# Patient Record
Sex: FEMALE | Race: BLACK OR AFRICAN AMERICAN | Employment: FULL TIME | URBAN - METROPOLITAN AREA
[De-identification: names, ages, dates, MRNs, and addresses within clinical notes are randomized per-mention and may not be internally consistent; named-entity substitution may affect disease eponyms.]

---

## 2019-01-03 PROBLEM — D64.9 ANEMIA: Status: ACTIVE | Noted: 2019-01-03

## 2020-10-15 ENCOUNTER — HOSPITAL ENCOUNTER (EMERGENCY)
Age: 45
Discharge: HOME | End: 2020-10-15
Payer: COMMERCIAL

## 2020-10-15 ENCOUNTER — APPOINTMENT (OUTPATIENT)
Dept: GENERAL RADIOLOGY | Age: 45
End: 2020-10-15
Attending: EMERGENCY MEDICINE
Payer: COMMERCIAL

## 2020-10-15 VITALS
RESPIRATION RATE: 20 BRPM | TEMPERATURE: 98.9 F | OXYGEN SATURATION: 97 % | WEIGHT: 270 LBS | HEART RATE: 92 BPM | DIASTOLIC BLOOD PRESSURE: 80 MMHG | BODY MASS INDEX: 39.99 KG/M2 | SYSTOLIC BLOOD PRESSURE: 147 MMHG | HEIGHT: 69 IN

## 2020-10-15 DIAGNOSIS — J45.901 ASTHMA WITH ACUTE EXACERBATION, UNSPECIFIED ASTHMA SEVERITY, UNSPECIFIED WHETHER PERSISTENT: Primary | ICD-10-CM

## 2020-10-15 LAB
ATRIAL RATE: 108 BPM
CALCULATED P AXIS, ECG09: 60 DEGREES
CALCULATED R AXIS, ECG10: 54 DEGREES
CALCULATED T AXIS, ECG11: 49 DEGREES
DIAGNOSIS, 93000: NORMAL
P-R INTERVAL, ECG05: 126 MS
Q-T INTERVAL, ECG07: 324 MS
QRS DURATION, ECG06: 66 MS
QTC CALCULATION (BEZET), ECG08: 434 MS
VENTRICULAR RATE, ECG03: 108 BPM

## 2020-10-15 PROCEDURE — 99283 EMERGENCY DEPT VISIT LOW MDM: CPT

## 2020-10-15 PROCEDURE — 96366 THER/PROPH/DIAG IV INF ADDON: CPT

## 2020-10-15 PROCEDURE — 94640 AIRWAY INHALATION TREATMENT: CPT

## 2020-10-15 PROCEDURE — 71046 X-RAY EXAM CHEST 2 VIEWS: CPT

## 2020-10-15 PROCEDURE — 74011250636 HC RX REV CODE- 250/636: Performed by: NURSE PRACTITIONER

## 2020-10-15 PROCEDURE — 96375 TX/PRO/DX INJ NEW DRUG ADDON: CPT

## 2020-10-15 PROCEDURE — 74011000250 HC RX REV CODE- 250: Performed by: NURSE PRACTITIONER

## 2020-10-15 PROCEDURE — 93005 ELECTROCARDIOGRAM TRACING: CPT

## 2020-10-15 PROCEDURE — 74011250637 HC RX REV CODE- 250/637: Performed by: NURSE PRACTITIONER

## 2020-10-15 PROCEDURE — 96365 THER/PROPH/DIAG IV INF INIT: CPT

## 2020-10-15 RX ORDER — MAGNESIUM SULFATE 1 G/100ML
1 INJECTION INTRAVENOUS
Status: COMPLETED | OUTPATIENT
Start: 2020-10-15 | End: 2020-10-15

## 2020-10-15 RX ORDER — PREDNISONE 20 MG/1
40 TABLET ORAL DAILY
Qty: 10 TAB | Refills: 0 | Status: SHIPPED | OUTPATIENT
Start: 2020-10-15 | End: 2020-10-20

## 2020-10-15 RX ORDER — IPRATROPIUM BROMIDE AND ALBUTEROL SULFATE 2.5; .5 MG/3ML; MG/3ML
3 SOLUTION RESPIRATORY (INHALATION)
Status: COMPLETED | OUTPATIENT
Start: 2020-10-15 | End: 2020-10-15

## 2020-10-15 RX ORDER — ALBUTEROL SULFATE 90 UG/1
2 AEROSOL, METERED RESPIRATORY (INHALATION)
Qty: 1 INHALER | Refills: 0 | Status: SHIPPED | OUTPATIENT
Start: 2020-10-15

## 2020-10-15 RX ORDER — ALBUTEROL SULFATE 90 UG/1
2 AEROSOL, METERED RESPIRATORY (INHALATION)
Status: COMPLETED | OUTPATIENT
Start: 2020-10-15 | End: 2020-10-15

## 2020-10-15 RX ADMIN — ALBUTEROL SULFATE 2 PUFF: 108 AEROSOL, METERED RESPIRATORY (INHALATION) at 14:26

## 2020-10-15 RX ADMIN — ALBUTEROL SULFATE 2 PUFF: 108 AEROSOL, METERED RESPIRATORY (INHALATION) at 12:46

## 2020-10-15 RX ADMIN — IPRATROPIUM BROMIDE AND ALBUTEROL SULFATE 3 ML: .5; 3 SOLUTION RESPIRATORY (INHALATION) at 15:52

## 2020-10-15 RX ADMIN — MAGNESIUM SULFATE 1 G: 1 INJECTION INTRAVENOUS at 15:55

## 2020-10-15 RX ADMIN — METHYLPREDNISOLONE SODIUM SUCCINATE 125 MG: 125 INJECTION, POWDER, FOR SOLUTION INTRAMUSCULAR; INTRAVENOUS at 12:44

## 2020-10-15 NOTE — ED PROVIDER NOTES
EMERGENCY DEPARTMENT HISTORY AND PHYSICAL EXAM      Date: 10/15/2020  Patient Name: Cici Johnston      History of Presenting Illness     Chief Complaint   Patient presents with    Shortness of Breath       History Provided By: Patient    HPI: Jaime Kahn, 41 y.o. female with a past medical history significant asthma and allergies presents to the ED with cc of shortness of breath and wheezing progressing over the last 3 days. + rhinorrhea. She is in a moderate amount of distress at this time with audible wheezing, accessory muscle use and difficulty talking in complete sentences. She denies any fever/chills, cough or any other URI symptoms. She states she is here visiting her parents and does not have her albuterol inhaler with her. Of note, she states she had a positive COVID exposure approximately 2 weeks ago, however she had a negative COVID test 6 days ago. There are no other complaints, changes, or physical findings at this time. PCP: Marc, MD Glen    Current Outpatient Medications   Medication Sig Dispense Refill    albuterol (Proventil HFA) 90 mcg/actuation inhaler Take 2 Puffs by inhalation every four (4) hours as needed for Wheezing. 1 Inhaler 0    predniSONE (DELTASONE) 20 mg tablet Take 40 mg by mouth daily for 5 days. With Breakfast 10 Tab 0    ibuprofen (MOTRIN) 800 mg tablet Take 1 Tab by mouth every six (6) hours as needed. 30 Tab 1    budesonide-formoterol (SYMBICORT) 160-4.5 mcg/actuation HFAA Take 2 Puffs by inhalation two (2) times a day.  albuterol (PROAIR HFA) 90 mcg/actuation inhaler Take 2 Puffs by inhalation every six (6) hours as needed for Wheezing.          Past History     Past Medical History:  Past Medical History:   Diagnosis Date    H/O seasonal allergies     History of heavy vaginal bleeding     Sinusitis     Vaginal fibroids        Past Surgical History:  Past Surgical History:   Procedure Laterality Date    HX MYOMECTOMY      X2  IR OCCL TXCATH ORGAN W SI         Family History:  Family History   Problem Relation Age of Onset   Yuen Hypertension Mother     Hypertension Father        Social History:  Social History     Tobacco Use    Smoking status: Never Smoker    Smokeless tobacco: Never Used   Substance Use Topics    Alcohol use: Yes    Drug use: No       Allergies:  No Known Allergies      Review of Systems     Review of Systems   Constitutional: Negative. Negative for chills, fatigue and fever. HENT: Positive for rhinorrhea. Negative for congestion, ear pain, sneezing, sore throat and trouble swallowing. Respiratory: Positive for chest tightness, shortness of breath and wheezing. Negative for cough. Cardiovascular: Negative. Negative for chest pain and palpitations. Gastrointestinal: Negative. Genitourinary: Negative. Musculoskeletal: Negative. Skin: Negative. Allergic/Immunologic: Positive for environmental allergies. Neurological: Negative. Negative for dizziness, weakness, light-headedness and headaches. Psychiatric/Behavioral: Negative. All other systems reviewed and are negative. Physical Exam     Physical Exam  Vitals signs and nursing note reviewed. Constitutional:       General: She is in acute distress. Appearance: Normal appearance. HENT:      Head: Normocephalic and atraumatic. Eyes:      Extraocular Movements: Extraocular movements intact. Conjunctiva/sclera: Conjunctivae normal.   Neck:      Musculoskeletal: Normal range of motion and neck supple. Cardiovascular:      Rate and Rhythm: Normal rate and regular rhythm. Heart sounds: Normal heart sounds. No murmur. Pulmonary:      Effort: Tachypnea and accessory muscle usage present. Breath sounds: Decreased air movement present. Examination of the right-upper field reveals wheezing. Examination of the left-upper field reveals wheezing. Examination of the right-lower field reveals wheezing.  Examination of the left-lower field reveals wheezing. Wheezing present. No rhonchi or rales. Abdominal:      General: Bowel sounds are normal.      Palpations: Abdomen is soft. Tenderness: There is no abdominal tenderness. Musculoskeletal: Normal range of motion. Skin:     General: Skin is warm and dry. Findings: No rash. Neurological:      General: No focal deficit present. Mental Status: She is alert. Psychiatric:         Mood and Affect: Mood normal.         Behavior: Behavior normal. Behavior is cooperative. Diagnostic Study Results     Labs -     No results found for this or any previous visit (from the past 12 hour(s)). Radiologic Studies -     CT Results  (Last 48 hours)    None        CXR Results  (Last 48 hours)               10/15/20 1112  XR CHEST PA LAT Final result    Impression:  IMPRESSION:       IMPRESSION: No acute finding. Narrative: Indication: Shortness of breath. PA and lateral radiographs of the chest 15 October 2020. No comparison. Clear lungs. Normal heart size. No pleural effusion or pneumothorax. Normal   bones. Safety pin superficially over the ventral chest.                     Medical Decision Making and ED Course     Vital Signs-Reviewed the patient's vital signs.   Patient Vitals for the past 12 hrs:   Pulse Resp BP SpO2   10/15/20 1740 92 20 (!) 147/80 97 %   10/15/20 1553    96 %   10/15/20 1426    97 %   10/15/20 1254    97 %         Records Reviewed: Nursing Notes    The patient presents with SOB with a differential diagnosis of asthma exacerbation, URI, respiratory failure, PNA, allergies, COVID-19      Provider Notes (Medical Decision Making):   Findings discussed with Dr. Clarisa Upton; pt is feeling much better with good air movement post neb tx and IV magnesium, she denies any further chest tightness, she has not had any hypoxia throughout visit; pt feels stable to go home, reinforced importance of returning promptly without delay should symptoms return, pt verbalizes understanding, will d/c with 5 days prednisone and albuterol HFA  [facility]       ED Course:     - I am the first and primary provider for this patient. - I reviewed the vital signs, available nursing notes, past medical history, past surgical history, family history and social history. Initial assessment performed. The patients presenting problems have been discussed, and they are in agreement with the care plan formulated and outlined with them. I have encouraged them to ask questions as they arise throughout their visit. ED Course as of Oct 15 2357   Thu Oct 15, 2020   1329 Pt feeling better, she denies dyspnea at this time; lungs diminished with scattered exp wheezing throughout     [LP]   1530 Pt reports feeling better but continues with few scattered wheezes and chest tightness, no hypoxia    [LP]   1721 Pt feeling much better post neb tx and magnesium, good air movement throughout    [LP]      ED Course User Index  [LP] Claudette Graham NP       CONSULTATIONS:    Attending, Dr Bulmaro Moncada    Disposition   discharge      DISCHARGE PLAN:  1. Current Discharge Medication List      CONTINUE these medications which have NOT CHANGED    Details   ibuprofen (MOTRIN) 800 mg tablet Take 1 Tab by mouth every six (6) hours as needed. Qty: 30 Tab, Refills: 1      budesonide-formoterol (SYMBICORT) 160-4.5 mcg/actuation HFAA Take 2 Puffs by inhalation two (2) times a day. albuterol (PROAIR HFA) 90 mcg/actuation inhaler Take 2 Puffs by inhalation every six (6) hours as needed for Wheezing.            2.   Follow-up Information     Follow up With Specialties Details Why [address] EMERGENCY DEPT Emergency Medicine  If symptoms worsen [address] [facility] [address]  [address]  Schedule an appointment as soon as possible for a visit  or your primary MD [address] [place] [address]  [phone] 3.  Return to ED if worse   4. Discharge Medication List as of 10/15/2020  5:34 PM      START taking these medications    Details   !! albuterol (Proventil HFA) 90 mcg/actuation inhaler Take 2 Puffs by inhalation every four (4) hours as needed for Wheezing., Normal, Disp-1 Inhaler,R-0      predniSONE (DELTASONE) 20 mg tablet Take 40 mg by mouth daily for 5 days. With Breakfast, Normal, Disp-10 Tab,R-0       !! - Potential duplicate medications found. Please discuss with provider. CONTINUE these medications which have NOT CHANGED    Details   ibuprofen (MOTRIN) 800 mg tablet Take 1 Tab by mouth every six (6) hours as needed. , Print, Disp-30 Tab, R-1      budesonide-formoterol (SYMBICORT) 160-4.5 mcg/actuation HFAA Take 2 Puffs by inhalation two (2) times a day., Historical Med      !! albuterol (PROAIR HFA) 90 mcg/actuation inhaler Take 2 Puffs by inhalation every six (6) hours as needed for Wheezing., Historical Med       !! - Potential duplicate medications found. Please discuss with provider. Diagnosis     Clinical Impression:   1. Asthma with acute exacerbation, unspecified asthma severity, unspecified whether persistent        Attestations:    Ahmet De La Cruz NP    Please note that this dictation was completed with [employer], the [employer] voice recognition software. Quite often unanticipated grammatical, syntax, homophones, and other interpretive errors are inadvertently transcribed by the computer software. Please disregard these errors. Please excuse any errors that have escaped final proofreading. Thank you.

## 2020-10-15 NOTE — ED TRIAGE NOTES
Pt states she has a \"pressure to breathe\" that started 2 days pta, denies cough, states the pressure comes and goes, describes it as a 'compression'

## 2022-03-20 PROBLEM — D64.9 ANEMIA: Status: ACTIVE | Noted: 2019-01-03

## 2023-05-11 RX ORDER — IBUPROFEN 800 MG/1
TABLET ORAL EVERY 6 HOURS PRN
COMMUNITY
Start: 2019-01-05

## 2023-05-11 RX ORDER — ALBUTEROL SULFATE 90 UG/1
2 AEROSOL, METERED RESPIRATORY (INHALATION) EVERY 4 HOURS PRN
COMMUNITY
Start: 2020-10-15

## 2023-05-11 RX ORDER — BUDESONIDE AND FORMOTEROL FUMARATE DIHYDRATE 160; 4.5 UG/1; UG/1
2 AEROSOL RESPIRATORY (INHALATION) 2 TIMES DAILY
COMMUNITY

## 2025-07-17 ENCOUNTER — HOSPITAL ENCOUNTER (INPATIENT)
Facility: HOSPITAL | Age: 50
LOS: 8 days | Discharge: HOME | DRG: 336 | End: 2025-07-25
Attending: INTERNAL MEDICINE | Admitting: INTERNAL MEDICINE
Payer: COMMERCIAL

## 2025-07-17 ENCOUNTER — APPOINTMENT (OUTPATIENT)
Facility: HOSPITAL | Age: 50
DRG: 336 | End: 2025-07-17
Payer: COMMERCIAL

## 2025-07-17 DIAGNOSIS — R10.31 RIGHT LOWER QUADRANT ABDOMINAL PAIN: Primary | ICD-10-CM

## 2025-07-17 DIAGNOSIS — K56.609 SBO (SMALL BOWEL OBSTRUCTION) (HCC): ICD-10-CM

## 2025-07-17 DIAGNOSIS — K66.0 ABDOMINAL ADHESIONS: ICD-10-CM

## 2025-07-17 PROBLEM — K56.50 SMALL BOWEL OBSTRUCTION DUE TO ADHESIONS (HCC): Status: ACTIVE | Noted: 2025-07-17

## 2025-07-17 PROBLEM — R10.9 ABDOMINAL PAIN: Status: ACTIVE | Noted: 2025-07-17

## 2025-07-17 LAB
ALBUMIN SERPL-MCNC: 4.1 G/DL (ref 3.5–5)
ALBUMIN/GLOB SERPL: 1 (ref 1.1–2.2)
ALP SERPL-CCNC: 93 U/L (ref 45–117)
ALT SERPL-CCNC: 44 U/L (ref 12–78)
ANION GAP SERPL CALC-SCNC: 4 MMOL/L (ref 2–12)
ANION GAP SERPL CALC-SCNC: 7 MMOL/L (ref 2–12)
APPEARANCE UR: ABNORMAL
AST SERPL W P-5'-P-CCNC: ABNORMAL U/L (ref 15–37)
BACTERIA URNS QL MICRO: ABNORMAL /HPF
BASOPHILS # BLD: 0.02 K/UL (ref 0–0.1)
BASOPHILS NFR BLD: 0.3 % (ref 0–1)
BILIRUB SERPL-MCNC: 0.5 MG/DL (ref 0.2–1)
BILIRUB UR QL: NEGATIVE
BUN SERPL-MCNC: 9 MG/DL (ref 6–20)
BUN SERPL-MCNC: 9 MG/DL (ref 6–20)
BUN/CREAT SERPL: 10 (ref 12–20)
BUN/CREAT SERPL: 11 (ref 12–20)
CA-I BLD-MCNC: 10.3 MG/DL (ref 8.5–10.1)
CA-I BLD-MCNC: 9.8 MG/DL (ref 8.5–10.1)
CHLORIDE SERPL-SCNC: 107 MMOL/L (ref 97–108)
CHLORIDE SERPL-SCNC: 109 MMOL/L (ref 97–108)
CO2 SERPL-SCNC: 25 MMOL/L (ref 21–32)
CO2 SERPL-SCNC: 26 MMOL/L (ref 21–32)
COLOR UR: ABNORMAL
CREAT SERPL-MCNC: 0.8 MG/DL (ref 0.55–1.02)
CREAT SERPL-MCNC: 0.86 MG/DL (ref 0.55–1.02)
DIFFERENTIAL METHOD BLD: ABNORMAL
EOSINOPHIL # BLD: 0 K/UL (ref 0–0.4)
EOSINOPHIL NFR BLD: 0 % (ref 0–7)
EPITH CASTS URNS QL MICRO: ABNORMAL /LPF
ERYTHROCYTE [DISTWIDTH] IN BLOOD BY AUTOMATED COUNT: 12.9 % (ref 11.5–14.5)
GLOBULIN SER CALC-MCNC: 4.2 G/DL (ref 2–4)
GLUCOSE SERPL-MCNC: 122 MG/DL (ref 65–100)
GLUCOSE SERPL-MCNC: 124 MG/DL (ref 65–100)
GLUCOSE UR STRIP.AUTO-MCNC: NEGATIVE MG/DL
HCG, URINE, POC: NEGATIVE
HCT VFR BLD AUTO: 44.4 % (ref 35–47)
HGB BLD-MCNC: 14.6 G/DL (ref 11.5–16)
HGB UR QL STRIP: NEGATIVE
IMM GRANULOCYTES # BLD AUTO: 0.03 K/UL (ref 0–0.04)
IMM GRANULOCYTES NFR BLD AUTO: 0.4 % (ref 0–0.5)
KETONES UR QL STRIP.AUTO: NEGATIVE MG/DL
LEUKOCYTE ESTERASE UR QL STRIP.AUTO: NEGATIVE
LIPASE SERPL-CCNC: 20 U/L (ref 13–75)
LYMPHOCYTES # BLD: 0.88 K/UL (ref 0.8–3.5)
LYMPHOCYTES NFR BLD: 12.2 % (ref 12–49)
Lab: NORMAL
MCH RBC QN AUTO: 28.1 PG (ref 26–34)
MCHC RBC AUTO-ENTMCNC: 32.9 G/DL (ref 30–36.5)
MCV RBC AUTO: 85.4 FL (ref 80–99)
MONOCYTES # BLD: 0.43 K/UL (ref 0–1)
MONOCYTES NFR BLD: 5.9 % (ref 5–13)
MUCOUS THREADS URNS QL MICRO: ABNORMAL /LPF
NEGATIVE QC PASS/FAIL: NORMAL
NEUTS SEG # BLD: 5.87 K/UL (ref 1.8–8)
NEUTS SEG NFR BLD: 81.2 % (ref 32–75)
NITRITE UR QL STRIP.AUTO: NEGATIVE
NRBC # BLD: 0 K/UL (ref 0–0.01)
NRBC BLD-RTO: 0 PER 100 WBC
PH UR STRIP: 5 (ref 5–8)
PLATELET # BLD AUTO: 337 K/UL (ref 150–400)
PMV BLD AUTO: 10.7 FL (ref 8.9–12.9)
POSITIVE QC PASS/FAIL: NORMAL
POTASSIUM SERPL-SCNC: 3.5 MMOL/L (ref 3.5–5.1)
POTASSIUM SERPL-SCNC: ABNORMAL MMOL/L (ref 3.5–5.1)
PROT SERPL-MCNC: 8.3 G/DL (ref 6.4–8.2)
PROT UR STRIP-MCNC: 30 MG/DL
RBC # BLD AUTO: 5.2 M/UL (ref 3.8–5.2)
RBC #/AREA URNS HPF: ABNORMAL /HPF (ref 0–5)
SODIUM SERPL-SCNC: 136 MMOL/L (ref 136–145)
SODIUM SERPL-SCNC: 142 MMOL/L (ref 136–145)
SP GR UR REFRACTOMETRY: 1.03 (ref 1–1.03)
URINE CULTURE IF INDICATED: ABNORMAL
UROBILINOGEN UR QL STRIP.AUTO: 0.1 EU/DL (ref 0.1–1)
WBC # BLD AUTO: 7.2 K/UL (ref 3.6–11)
WBC URNS QL MICRO: ABNORMAL /HPF (ref 0–4)

## 2025-07-17 PROCEDURE — 71045 X-RAY EXAM CHEST 1 VIEW: CPT

## 2025-07-17 PROCEDURE — 6360000002 HC RX W HCPCS

## 2025-07-17 PROCEDURE — 2580000003 HC RX 258: Performed by: SURGERY

## 2025-07-17 PROCEDURE — 2580000003 HC RX 258

## 2025-07-17 PROCEDURE — 1100000000 HC RM PRIVATE

## 2025-07-17 PROCEDURE — 81001 URINALYSIS AUTO W/SCOPE: CPT

## 2025-07-17 PROCEDURE — 99222 1ST HOSP IP/OBS MODERATE 55: CPT | Performed by: SURGERY

## 2025-07-17 PROCEDURE — 6360000002 HC RX W HCPCS: Performed by: INTERNAL MEDICINE

## 2025-07-17 PROCEDURE — 2500000003 HC RX 250 WO HCPCS: Performed by: INTERNAL MEDICINE

## 2025-07-17 PROCEDURE — 36415 COLL VENOUS BLD VENIPUNCTURE: CPT

## 2025-07-17 PROCEDURE — 6360000004 HC RX CONTRAST MEDICATION

## 2025-07-17 PROCEDURE — 6370000000 HC RX 637 (ALT 250 FOR IP): Performed by: SURGERY

## 2025-07-17 PROCEDURE — 85025 COMPLETE CBC W/AUTO DIFF WBC: CPT

## 2025-07-17 PROCEDURE — 6370000000 HC RX 637 (ALT 250 FOR IP)

## 2025-07-17 PROCEDURE — 83690 ASSAY OF LIPASE: CPT

## 2025-07-17 PROCEDURE — 99285 EMERGENCY DEPT VISIT HI MDM: CPT

## 2025-07-17 PROCEDURE — 74177 CT ABD & PELVIS W/CONTRAST: CPT

## 2025-07-17 PROCEDURE — 80048 BASIC METABOLIC PNL TOTAL CA: CPT

## 2025-07-17 PROCEDURE — 96374 THER/PROPH/DIAG INJ IV PUSH: CPT

## 2025-07-17 PROCEDURE — 96375 TX/PRO/DX INJ NEW DRUG ADDON: CPT

## 2025-07-17 PROCEDURE — 80053 COMPREHEN METABOLIC PANEL: CPT

## 2025-07-17 RX ORDER — ONDANSETRON 2 MG/ML
4 INJECTION INTRAMUSCULAR; INTRAVENOUS ONCE
Status: COMPLETED | OUTPATIENT
Start: 2025-07-17 | End: 2025-07-17

## 2025-07-17 RX ORDER — SODIUM CHLORIDE 0.9 % (FLUSH) 0.9 %
5-40 SYRINGE (ML) INJECTION PRN
Status: DISCONTINUED | OUTPATIENT
Start: 2025-07-17 | End: 2025-07-25 | Stop reason: HOSPADM

## 2025-07-17 RX ORDER — POTASSIUM CHLORIDE 1500 MG/1
40 TABLET, EXTENDED RELEASE ORAL PRN
Status: DISCONTINUED | OUTPATIENT
Start: 2025-07-17 | End: 2025-07-25 | Stop reason: HOSPADM

## 2025-07-17 RX ORDER — MIDAZOLAM HYDROCHLORIDE 2 MG/2ML
1 INJECTION, SOLUTION INTRAMUSCULAR; INTRAVENOUS ONCE
Status: DISCONTINUED | OUTPATIENT
Start: 2025-07-17 | End: 2025-07-25 | Stop reason: HOSPADM

## 2025-07-17 RX ORDER — SODIUM CHLORIDE, SODIUM LACTATE, POTASSIUM CHLORIDE, CALCIUM CHLORIDE 600; 310; 30; 20 MG/100ML; MG/100ML; MG/100ML; MG/100ML
INJECTION, SOLUTION INTRAVENOUS CONTINUOUS
Status: DISCONTINUED | OUTPATIENT
Start: 2025-07-17 | End: 2025-07-21

## 2025-07-17 RX ORDER — POTASSIUM CHLORIDE 7.45 MG/ML
10 INJECTION INTRAVENOUS PRN
Status: DISCONTINUED | OUTPATIENT
Start: 2025-07-17 | End: 2025-07-25 | Stop reason: HOSPADM

## 2025-07-17 RX ORDER — PROCHLORPERAZINE EDISYLATE 5 MG/ML
10 INJECTION INTRAMUSCULAR; INTRAVENOUS EVERY 6 HOURS PRN
Status: DISCONTINUED | OUTPATIENT
Start: 2025-07-17 | End: 2025-07-25 | Stop reason: HOSPADM

## 2025-07-17 RX ORDER — POLYETHYLENE GLYCOL 3350 17 G/17G
17 POWDER, FOR SOLUTION ORAL DAILY PRN
Status: DISCONTINUED | OUTPATIENT
Start: 2025-07-17 | End: 2025-07-25 | Stop reason: HOSPADM

## 2025-07-17 RX ORDER — SEMAGLUTIDE 1 MG/.5ML
1 INJECTION, SOLUTION SUBCUTANEOUS
COMMUNITY

## 2025-07-17 RX ORDER — HYDROXYZINE HYDROCHLORIDE 25 MG/ML
25 INJECTION, SOLUTION INTRAMUSCULAR ONCE
Status: DISCONTINUED | OUTPATIENT
Start: 2025-07-17 | End: 2025-07-17

## 2025-07-17 RX ORDER — ACETAMINOPHEN 325 MG/1
650 TABLET ORAL EVERY 6 HOURS PRN
Status: DISCONTINUED | OUTPATIENT
Start: 2025-07-17 | End: 2025-07-25 | Stop reason: HOSPADM

## 2025-07-17 RX ORDER — IOPAMIDOL 755 MG/ML
100 INJECTION, SOLUTION INTRAVASCULAR
Status: COMPLETED | OUTPATIENT
Start: 2025-07-17 | End: 2025-07-17

## 2025-07-17 RX ORDER — LIDOCAINE HYDROCHLORIDE 20 MG/ML
JELLY TOPICAL ONCE
Status: COMPLETED | OUTPATIENT
Start: 2025-07-17 | End: 2025-07-17

## 2025-07-17 RX ORDER — ONDANSETRON 2 MG/ML
4 INJECTION INTRAMUSCULAR; INTRAVENOUS EVERY 6 HOURS PRN
Status: DISCONTINUED | OUTPATIENT
Start: 2025-07-17 | End: 2025-07-25 | Stop reason: HOSPADM

## 2025-07-17 RX ORDER — ENOXAPARIN SODIUM 100 MG/ML
30 INJECTION SUBCUTANEOUS 2 TIMES DAILY
Status: DISCONTINUED | OUTPATIENT
Start: 2025-07-17 | End: 2025-07-25 | Stop reason: HOSPADM

## 2025-07-17 RX ORDER — SODIUM CHLORIDE 9 MG/ML
INJECTION, SOLUTION INTRAVENOUS PRN
Status: DISCONTINUED | OUTPATIENT
Start: 2025-07-17 | End: 2025-07-25 | Stop reason: HOSPADM

## 2025-07-17 RX ORDER — ACETAMINOPHEN 500 MG
1000 TABLET ORAL
Status: COMPLETED | OUTPATIENT
Start: 2025-07-17 | End: 2025-07-17

## 2025-07-17 RX ORDER — 0.9 % SODIUM CHLORIDE 0.9 %
1000 INTRAVENOUS SOLUTION INTRAVENOUS ONCE
Status: COMPLETED | OUTPATIENT
Start: 2025-07-17 | End: 2025-07-17

## 2025-07-17 RX ORDER — KETOROLAC TROMETHAMINE 15 MG/ML
15 INJECTION, SOLUTION INTRAMUSCULAR; INTRAVENOUS ONCE
Status: COMPLETED | OUTPATIENT
Start: 2025-07-17 | End: 2025-07-17

## 2025-07-17 RX ORDER — MORPHINE SULFATE 2 MG/ML
2 INJECTION, SOLUTION INTRAMUSCULAR; INTRAVENOUS EVERY 4 HOURS PRN
Status: DISPENSED | OUTPATIENT
Start: 2025-07-17 | End: 2025-07-19

## 2025-07-17 RX ORDER — SODIUM CHLORIDE 0.9 % (FLUSH) 0.9 %
5-40 SYRINGE (ML) INJECTION EVERY 12 HOURS SCHEDULED
Status: DISCONTINUED | OUTPATIENT
Start: 2025-07-17 | End: 2025-07-25 | Stop reason: HOSPADM

## 2025-07-17 RX ORDER — OXYMETAZOLINE HYDROCHLORIDE 0.05 G/100ML
2 SPRAY NASAL ONCE
Status: COMPLETED | OUTPATIENT
Start: 2025-07-17 | End: 2025-07-17

## 2025-07-17 RX ORDER — MIDAZOLAM HYDROCHLORIDE 2 MG/2ML
0.5 INJECTION, SOLUTION INTRAMUSCULAR; INTRAVENOUS ONCE
Status: DISCONTINUED | OUTPATIENT
Start: 2025-07-17 | End: 2025-07-17

## 2025-07-17 RX ORDER — ACETAMINOPHEN 650 MG/1
650 SUPPOSITORY RECTAL EVERY 6 HOURS PRN
Status: DISCONTINUED | OUTPATIENT
Start: 2025-07-17 | End: 2025-07-25 | Stop reason: HOSPADM

## 2025-07-17 RX ORDER — MAGNESIUM SULFATE IN WATER 40 MG/ML
2000 INJECTION, SOLUTION INTRAVENOUS PRN
Status: DISCONTINUED | OUTPATIENT
Start: 2025-07-17 | End: 2025-07-25 | Stop reason: HOSPADM

## 2025-07-17 RX ORDER — ONDANSETRON 4 MG/1
4 TABLET, ORALLY DISINTEGRATING ORAL EVERY 8 HOURS PRN
Status: DISCONTINUED | OUTPATIENT
Start: 2025-07-17 | End: 2025-07-25 | Stop reason: HOSPADM

## 2025-07-17 RX ADMIN — KETOROLAC TROMETHAMINE 15 MG: 15 INJECTION, SOLUTION INTRAMUSCULAR; INTRAVENOUS at 10:05

## 2025-07-17 RX ADMIN — ONDANSETRON 4 MG: 2 INJECTION INTRAMUSCULAR; INTRAVENOUS at 16:30

## 2025-07-17 RX ADMIN — MORPHINE SULFATE 2 MG: 2 INJECTION, SOLUTION INTRAMUSCULAR; INTRAVENOUS at 21:02

## 2025-07-17 RX ADMIN — SODIUM CHLORIDE, PRESERVATIVE FREE 10 ML: 5 INJECTION INTRAVENOUS at 20:34

## 2025-07-17 RX ADMIN — SODIUM CHLORIDE 1000 ML: 0.9 INJECTION, SOLUTION INTRAVENOUS at 10:08

## 2025-07-17 RX ADMIN — MORPHINE SULFATE 2 MG: 2 INJECTION, SOLUTION INTRAMUSCULAR; INTRAVENOUS at 16:29

## 2025-07-17 RX ADMIN — ONDANSETRON 4 MG: 2 INJECTION, SOLUTION INTRAMUSCULAR; INTRAVENOUS at 10:05

## 2025-07-17 RX ADMIN — ACETAMINOPHEN 1000 MG: 500 TABLET ORAL at 10:03

## 2025-07-17 RX ADMIN — SODIUM CHLORIDE 1000 ML: 9 INJECTION, SOLUTION INTRAVENOUS at 13:42

## 2025-07-17 RX ADMIN — LIDOCAINE HYDROCHLORIDE: 20 JELLY TOPICAL at 13:37

## 2025-07-17 RX ADMIN — SODIUM CHLORIDE, PRESERVATIVE FREE 10 ML: 5 INJECTION INTRAVENOUS at 16:31

## 2025-07-17 RX ADMIN — ONDANSETRON 4 MG: 2 INJECTION, SOLUTION INTRAMUSCULAR; INTRAVENOUS at 15:12

## 2025-07-17 RX ADMIN — IOPAMIDOL 100 ML: 755 INJECTION, SOLUTION INTRAVENOUS at 12:00

## 2025-07-17 RX ADMIN — PROCHLORPERAZINE EDISYLATE 10 MG: 5 INJECTION INTRAMUSCULAR; INTRAVENOUS at 21:18

## 2025-07-17 RX ADMIN — OXYMETAZOLINE HYDROCHLORIDE 2 SPRAY: 0.5 SPRAY NASAL at 13:37

## 2025-07-17 RX ADMIN — SODIUM CHLORIDE, SODIUM LACTATE, POTASSIUM CHLORIDE, AND CALCIUM CHLORIDE: .6; .31; .03; .02 INJECTION, SOLUTION INTRAVENOUS at 18:21

## 2025-07-17 ASSESSMENT — PAIN DESCRIPTION - LOCATION
LOCATION: ABDOMEN
LOCATION: ABDOMEN

## 2025-07-17 ASSESSMENT — LIFESTYLE VARIABLES
HOW MANY STANDARD DRINKS CONTAINING ALCOHOL DO YOU HAVE ON A TYPICAL DAY: PATIENT DOES NOT DRINK
HOW OFTEN DO YOU HAVE A DRINK CONTAINING ALCOHOL: MONTHLY OR LESS
HOW OFTEN DO YOU HAVE A DRINK CONTAINING ALCOHOL: NEVER
HOW MANY STANDARD DRINKS CONTAINING ALCOHOL DO YOU HAVE ON A TYPICAL DAY: 1 OR 2

## 2025-07-17 ASSESSMENT — PAIN SCALES - GENERAL
PAINLEVEL_OUTOF10: 10
PAINLEVEL_OUTOF10: 4
PAINLEVEL_OUTOF10: 3
PAINLEVEL_OUTOF10: 4
PAINLEVEL_OUTOF10: 7
PAINLEVEL_OUTOF10: 4
PAINLEVEL_OUTOF10: 6
PAINLEVEL_OUTOF10: 7
PAINLEVEL_OUTOF10: 3
PAINLEVEL_OUTOF10: 3
PAINLEVEL_OUTOF10: 7
PAINLEVEL_OUTOF10: 6

## 2025-07-17 ASSESSMENT — PAIN - FUNCTIONAL ASSESSMENT
PAIN_FUNCTIONAL_ASSESSMENT: 0-10

## 2025-07-17 NOTE — ED PROVIDER NOTES
[facility] EMERGENCY DEPT  EMERGENCY DEPARTMENT HISTORY AND PHYSICAL EXAM      Date of evaluation: 7/17/2025  Patient Name: Liz Simms  Birthdate 1975  MRN: [id number]  ED Provider: Brayan Alarcon PA-C   Note Started: 9:27 AM EDT 7/17/25    HISTORY OF PRESENT ILLNESS     Chief Complaint   Patient presents with    Vomiting       History Provided By: Patient, only     HPI: Liz Smims is a 49 y.o. female with past medical history as listed below presents emergency department for evaluation of sudden onset abdominal pain nausea vomiting that began 1 day prior to arrival.  Patient reports that she recently upped her dose of Wegovy and thinks this is contributing to her symptoms states that she referenced a friend of hers who is a physician and states that her symptoms are likely due to increase dose of her medication but she should be evaluated in the ER due to sudden nature of her symptoms.  States that she last had something to eat the night before, has not had anything to eat or drink today reporting difficulty keeping liquids and solids down.    PAST MEDICAL HISTORY   Past Medical History:  Past Medical History:   Diagnosis Date    H/O seasonal allergies     History of heavy vaginal bleeding     Sinusitis     Vaginal fibroids        Past Surgical History:  Past Surgical History:   Procedure Laterality Date    IR EMBOLIZATION TUMOR/ORGAN ISCH/INFARC      MYOMECTOMY      X2        Family History:  Family History   Problem Relation Age of Onset    Hypertension Father     Hypertension Mother        Social History:  Social History     Tobacco Use    Smoking status: Never    Smokeless tobacco: Never   Substance Use Topics    Alcohol use: Yes    Drug use: No       Allergies:  No Known Allergies    PCP: No, Pcp    Current Meds:   Current Facility-Administered Medications   Medication Dose Route Frequency Provider Last Rate Last Admin    morphine (PF) injection 2 mg  2 mg IntraVENous Q4H PRN

## 2025-07-17 NOTE — ED TRIAGE NOTES
Patient came in complaining of vomiting that started around 5pm yesterday evening. Patient takes wegovy and went up to 1mg on last Friday.

## 2025-07-17 NOTE — CONSULTS
General Surgery Consultation      History of Presentl Illness      Liz Simms is an 49 y.o. who has had a surgical history of myomectomies and eventually a hysterectomy in 2019, never had an episode of bowel obstruction, presents today with a one day history of acute onset of abdominal pain associated with nausea and vomiting along with obstipation, with no flatus or stool since the pain started. Now reports pain some what improved but not fully resolved.     Past Medical History:   Diagnosis Date    H/O seasonal allergies     History of heavy vaginal bleeding     Sinusitis     Vaginal fibroids      Family History   Problem Relation Age of Onset    Hypertension Father     Hypertension Mother      Prior to Admission Medications   Prescriptions Last Dose Informant Patient Reported? Taking?   Semaglutide-Weight Management (WEGOVY) 1 MG/0.5ML SOAJ SC injection Past Week  Yes Yes   Sig: Inject 1 mg into the skin every 7 days   albuterol sulfate HFA (PROVENTIL;VENTOLIN;PROAIR) 108 (90 Base) MCG/ACT inhaler Past Week  Yes Yes   Sig: Inhale 2 puffs into the lungs every 4 hours as needed   budesonide-formoterol (SYMBICORT) 160-4.5 MCG/ACT AERO Past Week  Yes Yes   Sig: Inhale 2 puffs into the lungs 2 times daily   ibuprofen (ADVIL;MOTRIN) 800 MG tablet Past Week  Yes Yes   Sig: Take by mouth every 6 hours as needed      Facility-Administered Medications: None     No Known Allergies  Social History     Socioeconomic History    Marital status:      Spouse name: Not on file    Number of children: Not on file    Years of education: Not on file    Highest education level: Not on file   Occupational History    Not on file   Tobacco Use    Smoking status: Never    Smokeless tobacco: Never   Substance and Sexual Activity    Alcohol use: Yes    Drug use: No    Sexual activity: Not on file   Other Topics Concern    Not on file   Social History Narrative    Not on file     Social Drivers of Health     Financial

## 2025-07-17 NOTE — H&P
V2.0  History and Physical      Name:  Liz Simms /Age/Sex: 1975  (49 y.o. female)   MRN & CSN:  [id number] & [id number] Encounter Date/Time: 2025 3:35 PM EDT   Location:  ER PCP: Roxy, Pcp       Hospital Day: 1    Assessment and Plan:   Liz Simms is a 49 y.o. female with a benign pmh who presents with Abdominal pain    Hospital Problems           Last Modified POA    * (Principal) Abdominal pain 2025 Yes         Principal Problem:    Abdominal pain  Secondary to small bowel obstruction  Consider cause possibly due to internal hernia but also need to consider that Wegovy can slow GI transit and perhaps may be contributing  Plan:   .  Continue NG suction  IV fluids  Conservative use of pain medication  Antiemetic as needed  Discussed with the patient that she should have a conversation with the prescriber of her Wegovy to see if he can still be given or if any adjustment need to be made       Admit to In-Patient    Disposition:   Current Living situation: Home  Expected Disposition: Home  Estimated D/C: 2-3 days    Diet N.p.o.   DVT Prophylaxis [x] Lovenox, []  Heparin, [] SCDs, [] Ambulation,  [] Eliquis, [] Xarelto, [] Coumadin   Code Status Full   Surrogate Decision Maker/ POA Unknown     Personally reviewed Lab Studies and Imaging     Discussed management with the ED provider and agree with the plan for hospitalization      History from:     patient, Quality of history:  good historian    Prior records reviewed     History of Present Illness:     Chief Complaint: Abdominal pain, nausea, vomiting    Liz Simms is a 49 y.o. female with a benign pmh who presents with Abdominal pain  Symptoms of sudden and acute onset beginning today.  Patient has had prior emergency hysterectomy and says that some of her bowels had to be \"reconstructed\".  ER evaluation shows a CT scan with a small bowel obstruction with transition point possibly secondary to internal

## 2025-07-17 NOTE — ED NOTES
Resting in bed. Pain at a 4 on pain scale. Vss. Family at bedside. To CT at this time. Urine collected and sent to lab as ordered. Will continue to closely monitor.

## 2025-07-17 NOTE — ED NOTES
Notes some nausea but other wise comfortable. No s/s of distress. Tolerating NGT currently. A/o X4. Family at [facility] will continue to closely monitor.

## 2025-07-17 NOTE — ED NOTES
Resting comfortably in bed. Denies needs. No s/s of distress. Tolerating NGT well. Will continue to closely monitor.

## 2025-07-17 NOTE — ED NOTES
ED TO INPATIENT SBAR HANDOFF    Patient Name: Liz Simms   Preferred Name: Liz Sánchez  : 1975  49 y.o.   Family/Caregiver Present: no   Code Status Order: No Order  PO Status: NPO:No  Telemetry Order: No  C-SSRS: Risk of Suicide: No Risk  Sitter no   Restraints:     Sepsis Risk Score Sepsis V2 Risk Score: 5.4    Situation  Chief Complaint   Patient presents with    Vomiting     Brief Description of Patient's Condition: abd pain with SBO. NGT placed.  Mental Status: oriented, alert, coherent, logical, thought processes intact, and able to concentrate and follow conversation  Arrived from:Home  Imaging:   XR CHEST PORTABLE   Final Result      No acute process on portable chest. Unchanged elevation of the left   hemidiaphragm. Nasogastric tube appears to be in satisfactory position.         Electronically signed by Martin Salinas      CT ABDOMEN PELVIS W IV CONTRAST Additional Contrast? None   Final Result   Distal small bowel obstruction, likely secondary to internal hernia.      Electronically signed by Martin Salinas        Abnormal labs:   Abnormal Labs Reviewed   CBC WITH AUTO DIFFERENTIAL - Abnormal; Notable for the following components:       Result Value    Neutrophils % 81.2 (*)     All other components within normal limits   COMPREHENSIVE METABOLIC PANEL - Abnormal; Notable for the following components:    Glucose 122 (*)     BUN/Creatinine Ratio 10 (*)     Calcium 10.3 (*)     Total Protein 8.3 (*)     Globulin 4.2 (*)     Albumin/Globulin Ratio 1.0 (*)     All other components within normal limits   URINALYSIS WITH REFLEX TO CULTURE - Abnormal; Notable for the following components:    Appearance Turbid (*)     Protein, UA 30 (*)     Epithelial Cells, UA Moderate (*)     BACTERIA, URINE 1+ (*)     Mucus, UA 3+ (*)     All other components within normal limits       Background  Allergies: No Known Allergies  History:   Past Medical History:   Diagnosis Date    H/O seasonal allergies

## 2025-07-17 NOTE — CARE COORDINATION
07/17/25 1447   Service Assessment   Patient Orientation Alert and Oriented   Cognition Alert   History Provided By Patient   Primary Caregiver Self   Accompanied By/Relationship Friend   Support Systems Family Members;Friends/Neighbors   Patient's Healthcare Decision Maker is: Legal Next of Kin   PCP Verified by CM Yes  (Princess Reese - seen 2 weeks ago.)   Last Visit to PCP Within last 3 months   Prior Functional Level Independent in ADLs/IADLs   Current Functional Level Independent in ADLs/IADLs   Can patient return to prior living arrangement Yes   Ability to make needs known: Good   Family able to assist with home care needs: Yes   Would you like for me to discuss the discharge plan with any other family members/significant others, and if so, who? Yes  (Friend Taylor Hansen)   Financial Resources Other (Comment)  (Mai MOSHER)   Community Resources None   CM/SW Referral Other (see comment)  (None)   Social/Functional History   Lives With Alone   Type of Home House   Home Layout Two level;Performs ADL's on one level   Home Access Stairs to enter without rails   Entrance Stairs - Number of Steps 2   Bathroom Shower/Tub Tub/Shower unit   Bathroom Toilet Standard   Bathroom Equipment None   Bathroom Accessibility Accessible   Home Equipment None   Receives Help From Friend(s)   Prior Level of Assist for ADLs Independent   Prior Level of Assist for Homemaking Independent   Homemaking Responsibilities Yes   Ambulation Assistance Independent   Prior Level of Assist for Transfers Independent   Active  Yes   Occupation Full time employment   Discharge Planning   Type of Residence House   Living Arrangements Alone   Current Services Prior To Admission None   Potential Assistance Needed N/A   DME Ordered? No   Potential Assistance Purchasing Medications No   Type of Home Care Services None   Patient expects to be discharged to: House   One/Two Story Residence Two story, live on first floor   History of falls?

## 2025-07-18 ENCOUNTER — APPOINTMENT (OUTPATIENT)
Facility: HOSPITAL | Age: 50
DRG: 336 | End: 2025-07-18
Payer: COMMERCIAL

## 2025-07-18 LAB
BASOPHILS # BLD: 0.01 K/UL (ref 0–0.1)
BASOPHILS NFR BLD: 0.2 % (ref 0–1)
DIFFERENTIAL METHOD BLD: NORMAL
EOSINOPHIL # BLD: 0.06 K/UL (ref 0–0.4)
EOSINOPHIL NFR BLD: 1 % (ref 0–7)
ERYTHROCYTE [DISTWIDTH] IN BLOOD BY AUTOMATED COUNT: 13.1 % (ref 11.5–14.5)
HCT VFR BLD AUTO: 41.7 % (ref 35–47)
HGB BLD-MCNC: 13.6 G/DL (ref 11.5–16)
IMM GRANULOCYTES # BLD AUTO: 0.02 K/UL (ref 0–0.04)
IMM GRANULOCYTES NFR BLD AUTO: 0.3 % (ref 0–0.5)
LACTATE SERPL-SCNC: 0.9 MMOL/L (ref 0.4–2)
LYMPHOCYTES # BLD: 1.4 K/UL (ref 0.8–3.5)
LYMPHOCYTES NFR BLD: 22.2 % (ref 12–49)
MCH RBC QN AUTO: 28.1 PG (ref 26–34)
MCHC RBC AUTO-ENTMCNC: 32.6 G/DL (ref 30–36.5)
MCV RBC AUTO: 86.2 FL (ref 80–99)
MONOCYTES # BLD: 0.73 K/UL (ref 0–1)
MONOCYTES NFR BLD: 11.6 % (ref 5–13)
NEUTS SEG # BLD: 4.09 K/UL (ref 1.8–8)
NEUTS SEG NFR BLD: 64.7 % (ref 32–75)
NRBC # BLD: 0 K/UL (ref 0–0.01)
NRBC BLD-RTO: 0 PER 100 WBC
PLATELET # BLD AUTO: 315 K/UL (ref 150–400)
PMV BLD AUTO: 10.8 FL (ref 8.9–12.9)
RBC # BLD AUTO: 4.84 M/UL (ref 3.8–5.2)
WBC # BLD AUTO: 6.3 K/UL (ref 3.6–11)

## 2025-07-18 PROCEDURE — 99232 SBSQ HOSP IP/OBS MODERATE 35: CPT | Performed by: SURGERY

## 2025-07-18 PROCEDURE — 6370000000 HC RX 637 (ALT 250 FOR IP): Performed by: INTERNAL MEDICINE

## 2025-07-18 PROCEDURE — 2580000003 HC RX 258: Performed by: SURGERY

## 2025-07-18 PROCEDURE — 2500000003 HC RX 250 WO HCPCS: Performed by: INTERNAL MEDICINE

## 2025-07-18 PROCEDURE — 74019 RADEX ABDOMEN 2 VIEWS: CPT

## 2025-07-18 PROCEDURE — 6360000002 HC RX W HCPCS: Performed by: INTERNAL MEDICINE

## 2025-07-18 PROCEDURE — 6360000002 HC RX W HCPCS

## 2025-07-18 PROCEDURE — 36415 COLL VENOUS BLD VENIPUNCTURE: CPT

## 2025-07-18 PROCEDURE — 6360000004 HC RX CONTRAST MEDICATION: Performed by: SURGERY

## 2025-07-18 PROCEDURE — 83605 ASSAY OF LACTIC ACID: CPT

## 2025-07-18 PROCEDURE — 6360000002 HC RX W HCPCS: Performed by: NURSE PRACTITIONER

## 2025-07-18 PROCEDURE — 74018 RADEX ABDOMEN 1 VIEW: CPT

## 2025-07-18 PROCEDURE — 85025 COMPLETE CBC W/AUTO DIFF WBC: CPT

## 2025-07-18 PROCEDURE — 1100000000 HC RM PRIVATE

## 2025-07-18 RX ORDER — PANTOPRAZOLE SODIUM 40 MG/10ML
40 INJECTION, POWDER, LYOPHILIZED, FOR SOLUTION INTRAVENOUS 2 TIMES DAILY
Status: DISCONTINUED | OUTPATIENT
Start: 2025-07-18 | End: 2025-07-24

## 2025-07-18 RX ORDER — DIATRIZOATE MEGLUMINE AND DIATRIZOATE SODIUM 660; 100 MG/ML; MG/ML
80 SOLUTION ORAL; RECTAL
Status: COMPLETED | OUTPATIENT
Start: 2025-07-18 | End: 2025-07-18

## 2025-07-18 RX ADMIN — SODIUM CHLORIDE, PRESERVATIVE FREE 10 ML: 5 INJECTION INTRAVENOUS at 10:44

## 2025-07-18 RX ADMIN — MORPHINE SULFATE 2 MG: 2 INJECTION, SOLUTION INTRAMUSCULAR; INTRAVENOUS at 11:35

## 2025-07-18 RX ADMIN — DIATRIZOATE MEGLUMINE AND DIATRIZOATE SODIUM 80 ML: 660; 100 LIQUID ORAL; RECTAL at 14:58

## 2025-07-18 RX ADMIN — SODIUM CHLORIDE, PRESERVATIVE FREE 10 ML: 5 INJECTION INTRAVENOUS at 09:12

## 2025-07-18 RX ADMIN — PANTOPRAZOLE SODIUM 40 MG: 40 INJECTION, POWDER, FOR SOLUTION INTRAVENOUS at 21:16

## 2025-07-18 RX ADMIN — SODIUM CHLORIDE, PRESERVATIVE FREE 10 ML: 5 INJECTION INTRAVENOUS at 21:37

## 2025-07-18 RX ADMIN — ACETAMINOPHEN 650 MG: 325 TABLET ORAL at 14:58

## 2025-07-18 RX ADMIN — ONDANSETRON 4 MG: 4 TABLET, ORALLY DISINTEGRATING ORAL at 07:21

## 2025-07-18 RX ADMIN — PROCHLORPERAZINE EDISYLATE 10 MG: 5 INJECTION INTRAMUSCULAR; INTRAVENOUS at 11:38

## 2025-07-18 RX ADMIN — SODIUM CHLORIDE, SODIUM LACTATE, POTASSIUM CHLORIDE, AND CALCIUM CHLORIDE: .6; .31; .03; .02 INJECTION, SOLUTION INTRAVENOUS at 01:33

## 2025-07-18 RX ADMIN — MORPHINE SULFATE 2 MG: 2 INJECTION, SOLUTION INTRAMUSCULAR; INTRAVENOUS at 15:49

## 2025-07-18 RX ADMIN — SODIUM CHLORIDE, SODIUM LACTATE, POTASSIUM CHLORIDE, AND CALCIUM CHLORIDE: .6; .31; .03; .02 INJECTION, SOLUTION INTRAVENOUS at 21:42

## 2025-07-18 RX ADMIN — PANTOPRAZOLE SODIUM 40 MG: 40 INJECTION, POWDER, FOR SOLUTION INTRAVENOUS at 10:43

## 2025-07-18 RX ADMIN — ENOXAPARIN SODIUM 30 MG: 100 INJECTION SUBCUTANEOUS at 21:16

## 2025-07-18 ASSESSMENT — PAIN DESCRIPTION - DESCRIPTORS: DESCRIPTORS: CRAMPING;THROBBING

## 2025-07-18 ASSESSMENT — PAIN SCALES - GENERAL
PAINLEVEL_OUTOF10: 6
PAINLEVEL_OUTOF10: 4
PAINLEVEL_OUTOF10: 3
PAINLEVEL_OUTOF10: 1
PAINLEVEL_OUTOF10: 3
PAINLEVEL_OUTOF10: 8

## 2025-07-18 ASSESSMENT — PAIN DESCRIPTION - LOCATION: LOCATION: ABDOMEN

## 2025-07-18 NOTE — CARE COORDINATION
Patient currently NPO, has NG tube to suction, IV hydration.     No identified CM needs at this time.     Current disposition is home.

## 2025-07-18 NOTE — PLAN OF CARE
Problem: Discharge Planning  Goal: Discharge to home or other facility with appropriate resources  7/18/2025 0949 by Shell Calhoun, RN  Outcome: Progressing  7/17/2025 2028 by Shell Calhoun, RN  Outcome: Progressing

## 2025-07-18 NOTE — PLAN OF CARE
Problem: Discharge Planning  Goal: Discharge to home or other facility with appropriate resources  7/17/2025 2028 by Shell Calhoun, RN  Outcome: Progressing  7/17/2025 1933 by Shell Calhoun, RN  Outcome: Progressing

## 2025-07-19 ENCOUNTER — APPOINTMENT (OUTPATIENT)
Facility: HOSPITAL | Age: 50
DRG: 336 | End: 2025-07-19
Payer: COMMERCIAL

## 2025-07-19 LAB
ALBUMIN SERPL-MCNC: 3.5 G/DL (ref 3.5–5)
ALBUMIN/GLOB SERPL: 1 (ref 1.1–2.2)
ALP SERPL-CCNC: 72 U/L (ref 45–117)
ALT SERPL-CCNC: 32 U/L (ref 12–78)
ANION GAP SERPL CALC-SCNC: 6 MMOL/L (ref 2–12)
AST SERPL W P-5'-P-CCNC: 29 U/L (ref 15–37)
BASOPHILS # BLD: 0.03 K/UL (ref 0–0.1)
BASOPHILS NFR BLD: 0.4 % (ref 0–1)
BILIRUB SERPL-MCNC: 0.7 MG/DL (ref 0.2–1)
BUN SERPL-MCNC: 9 MG/DL (ref 6–20)
BUN/CREAT SERPL: 12 (ref 12–20)
CA-I BLD-MCNC: 9.5 MG/DL (ref 8.5–10.1)
CHLORIDE SERPL-SCNC: 107 MMOL/L (ref 97–108)
CO2 SERPL-SCNC: 28 MMOL/L (ref 21–32)
CREAT SERPL-MCNC: 0.74 MG/DL (ref 0.55–1.02)
DIFFERENTIAL METHOD BLD: NORMAL
EOSINOPHIL # BLD: 0.02 K/UL (ref 0–0.4)
EOSINOPHIL NFR BLD: 0.2 % (ref 0–7)
ERYTHROCYTE [DISTWIDTH] IN BLOOD BY AUTOMATED COUNT: 13.1 % (ref 11.5–14.5)
GLOBULIN SER CALC-MCNC: 3.4 G/DL (ref 2–4)
GLUCOSE SERPL-MCNC: 110 MG/DL (ref 65–100)
HCT VFR BLD AUTO: 40.5 % (ref 35–47)
HGB BLD-MCNC: 13.3 G/DL (ref 11.5–16)
IMM GRANULOCYTES # BLD AUTO: 0.02 K/UL (ref 0–0.04)
IMM GRANULOCYTES NFR BLD AUTO: 0.2 % (ref 0–0.5)
LYMPHOCYTES # BLD: 1.32 K/UL (ref 0.8–3.5)
LYMPHOCYTES NFR BLD: 16.5 % (ref 12–49)
MAGNESIUM SERPL-MCNC: 2.1 MG/DL (ref 1.6–2.4)
MCH RBC QN AUTO: 28.7 PG (ref 26–34)
MCHC RBC AUTO-ENTMCNC: 32.8 G/DL (ref 30–36.5)
MCV RBC AUTO: 87.5 FL (ref 80–99)
MONOCYTES # BLD: 0.75 K/UL (ref 0–1)
MONOCYTES NFR BLD: 9.4 % (ref 5–13)
NEUTS SEG # BLD: 5.87 K/UL (ref 1.8–8)
NEUTS SEG NFR BLD: 73.3 % (ref 32–75)
NRBC # BLD: 0 K/UL (ref 0–0.01)
NRBC BLD-RTO: 0 PER 100 WBC
PLATELET # BLD AUTO: 308 K/UL (ref 150–400)
PMV BLD AUTO: 11 FL (ref 8.9–12.9)
POTASSIUM SERPL-SCNC: 3.6 MMOL/L (ref 3.5–5.1)
PROT SERPL-MCNC: 6.9 G/DL (ref 6.4–8.2)
RBC # BLD AUTO: 4.63 M/UL (ref 3.8–5.2)
SODIUM SERPL-SCNC: 141 MMOL/L (ref 136–145)
WBC # BLD AUTO: 8 K/UL (ref 3.6–11)

## 2025-07-19 PROCEDURE — 6360000002 HC RX W HCPCS: Performed by: NURSE PRACTITIONER

## 2025-07-19 PROCEDURE — 74019 RADEX ABDOMEN 2 VIEWS: CPT

## 2025-07-19 PROCEDURE — 1100000000 HC RM PRIVATE

## 2025-07-19 PROCEDURE — 2580000003 HC RX 258: Performed by: SURGERY

## 2025-07-19 PROCEDURE — 80053 COMPREHEN METABOLIC PANEL: CPT

## 2025-07-19 PROCEDURE — 6360000002 HC RX W HCPCS: Performed by: INTERNAL MEDICINE

## 2025-07-19 PROCEDURE — 2500000003 HC RX 250 WO HCPCS: Performed by: INTERNAL MEDICINE

## 2025-07-19 PROCEDURE — 6360000002 HC RX W HCPCS: Performed by: SURGERY

## 2025-07-19 PROCEDURE — 36415 COLL VENOUS BLD VENIPUNCTURE: CPT

## 2025-07-19 PROCEDURE — 83735 ASSAY OF MAGNESIUM: CPT

## 2025-07-19 PROCEDURE — 6360000002 HC RX W HCPCS

## 2025-07-19 PROCEDURE — 99231 SBSQ HOSP IP/OBS SF/LOW 25: CPT | Performed by: SURGERY

## 2025-07-19 PROCEDURE — 85025 COMPLETE CBC W/AUTO DIFF WBC: CPT

## 2025-07-19 RX ORDER — HYDRALAZINE HYDROCHLORIDE 20 MG/ML
10 INJECTION INTRAMUSCULAR; INTRAVENOUS EVERY 6 HOURS PRN
Status: DISCONTINUED | OUTPATIENT
Start: 2025-07-19 | End: 2025-07-25 | Stop reason: HOSPADM

## 2025-07-19 RX ORDER — MORPHINE SULFATE 2 MG/ML
2 INJECTION, SOLUTION INTRAMUSCULAR; INTRAVENOUS EVERY 4 HOURS PRN
Refills: 0 | Status: DISCONTINUED | OUTPATIENT
Start: 2025-07-19 | End: 2025-07-21

## 2025-07-19 RX ADMIN — SODIUM CHLORIDE, SODIUM LACTATE, POTASSIUM CHLORIDE, AND CALCIUM CHLORIDE: .6; .31; .03; .02 INJECTION, SOLUTION INTRAVENOUS at 12:56

## 2025-07-19 RX ADMIN — SODIUM CHLORIDE, PRESERVATIVE FREE 10 ML: 5 INJECTION INTRAVENOUS at 10:03

## 2025-07-19 RX ADMIN — PANTOPRAZOLE SODIUM 40 MG: 40 INJECTION, POWDER, FOR SOLUTION INTRAVENOUS at 21:10

## 2025-07-19 RX ADMIN — MORPHINE SULFATE 2 MG: 2 INJECTION, SOLUTION INTRAMUSCULAR; INTRAVENOUS at 17:53

## 2025-07-19 RX ADMIN — PROCHLORPERAZINE EDISYLATE 10 MG: 5 INJECTION INTRAMUSCULAR; INTRAVENOUS at 17:53

## 2025-07-19 RX ADMIN — SODIUM CHLORIDE, PRESERVATIVE FREE 10 ML: 5 INJECTION INTRAVENOUS at 21:11

## 2025-07-19 RX ADMIN — ONDANSETRON 4 MG: 2 INJECTION INTRAMUSCULAR; INTRAVENOUS at 23:18

## 2025-07-19 RX ADMIN — ONDANSETRON 4 MG: 2 INJECTION INTRAMUSCULAR; INTRAVENOUS at 13:19

## 2025-07-19 RX ADMIN — PROCHLORPERAZINE EDISYLATE 10 MG: 5 INJECTION INTRAMUSCULAR; INTRAVENOUS at 10:07

## 2025-07-19 RX ADMIN — SODIUM CHLORIDE, SODIUM LACTATE, POTASSIUM CHLORIDE, AND CALCIUM CHLORIDE: .6; .31; .03; .02 INJECTION, SOLUTION INTRAVENOUS at 04:49

## 2025-07-19 RX ADMIN — ENOXAPARIN SODIUM 30 MG: 100 INJECTION SUBCUTANEOUS at 21:10

## 2025-07-19 RX ADMIN — MORPHINE SULFATE 2 MG: 2 INJECTION, SOLUTION INTRAMUSCULAR; INTRAVENOUS at 10:07

## 2025-07-19 RX ADMIN — ENOXAPARIN SODIUM 30 MG: 100 INJECTION SUBCUTANEOUS at 10:03

## 2025-07-19 RX ADMIN — MORPHINE SULFATE 2 MG: 2 INJECTION, SOLUTION INTRAMUSCULAR; INTRAVENOUS at 13:20

## 2025-07-19 RX ADMIN — PANTOPRAZOLE SODIUM 40 MG: 40 INJECTION, POWDER, FOR SOLUTION INTRAVENOUS at 10:02

## 2025-07-19 RX ADMIN — ONDANSETRON 4 MG: 2 INJECTION INTRAMUSCULAR; INTRAVENOUS at 06:04

## 2025-07-19 RX ADMIN — MORPHINE SULFATE 2 MG: 2 INJECTION, SOLUTION INTRAMUSCULAR; INTRAVENOUS at 01:25

## 2025-07-19 ASSESSMENT — PAIN SCALES - GENERAL
PAINLEVEL_OUTOF10: 1
PAINLEVEL_OUTOF10: 1
PAINLEVEL_OUTOF10: 0
PAINLEVEL_OUTOF10: 7
PAINLEVEL_OUTOF10: 4
PAINLEVEL_OUTOF10: 4
PAINLEVEL_OUTOF10: 0
PAINLEVEL_OUTOF10: 0
PAINLEVEL_OUTOF10: 6

## 2025-07-19 ASSESSMENT — PAIN SCALES - WONG BAKER
WONGBAKER_NUMERICALRESPONSE: NO HURT

## 2025-07-19 ASSESSMENT — PAIN DESCRIPTION - LOCATION
LOCATION: ABDOMEN

## 2025-07-19 ASSESSMENT — PAIN DESCRIPTION - DESCRIPTORS: DESCRIPTORS: DISCOMFORT;ACHING

## 2025-07-19 ASSESSMENT — PAIN DESCRIPTION - ORIENTATION: ORIENTATION: LEFT;UPPER

## 2025-07-19 ASSESSMENT — PAIN - FUNCTIONAL ASSESSMENT: PAIN_FUNCTIONAL_ASSESSMENT: PREVENTS OR INTERFERES SOME ACTIVE ACTIVITIES AND ADLS

## 2025-07-19 NOTE — PLAN OF CARE
Problem: Discharge Planning  Goal: Discharge to home or other facility with appropriate resources  7/19/2025 1012 by Eun Wakefield RN  Outcome: Progressing  7/19/2025 0228 by Chloé Lopez RN  Outcome: Progressing  Flowsheets (Taken 7/19/2025 0228)  Discharge to home or other facility with appropriate resources:   Identify barriers to discharge with patient and caregiver   Arrange for needed discharge resources and transportation as appropriate   Identify discharge learning needs (meds, wound care, etc)     Problem: Pain  Goal: Verbalizes/displays adequate comfort level or baseline comfort level  7/19/2025 1012 by Eun Wakefield RN  Outcome: Progressing  7/19/2025 0228 by Chloé Lopez RN  Outcome: Progressing  Flowsheets (Taken 7/19/2025 0228)  Verbalizes/displays adequate comfort level or baseline comfort level:   Encourage patient to monitor pain and request assistance   Assess pain using appropriate pain scale   Administer analgesics based on type and severity of pain and evaluate response     Problem: Safety - Adult  Goal: Free from fall injury  7/19/2025 1012 by Eun Wakefield RN  Outcome: Progressing  7/19/2025 0228 by Chloé Lopez RN  Outcome: Progressing  Flowsheets (Taken 7/19/2025 0228)  Free From Fall Injury:   Instruct family/caregiver on patient safety   Based on caregiver fall risk screen, instruct family/caregiver to ask for assistance with transferring infant if caregiver noted to have fall risk factors

## 2025-07-19 NOTE — PLAN OF CARE
Problem: Discharge Planning  Goal: Discharge to home or other facility with appropriate resources  Outcome: Progressing  Flowsheets (Taken 7/19/2025 0228)  Discharge to home or other facility with appropriate resources:   Identify barriers to discharge with patient and caregiver   Arrange for needed discharge resources and transportation as appropriate   Identify discharge learning needs (meds, wound care, etc)     Problem: Pain  Goal: Verbalizes/displays adequate comfort level or baseline comfort level  Outcome: Progressing  Flowsheets (Taken 7/19/2025 0228)  Verbalizes/displays adequate comfort level or baseline comfort level:   Encourage patient to monitor pain and request assistance   Assess pain using appropriate pain scale   Administer analgesics based on type and severity of pain and evaluate response     Problem: Safety - Adult  Goal: Free from fall injury  Outcome: Progressing  Flowsheets (Taken 7/19/2025 0228)  Free From Fall Injury:   Instruct family/caregiver on patient safety   Based on caregiver fall risk screen, instruct family/caregiver to ask for assistance with transferring infant if caregiver noted to have fall risk factors

## 2025-07-20 ENCOUNTER — ANESTHESIA EVENT (OUTPATIENT)
Facility: HOSPITAL | Age: 50
DRG: 336 | End: 2025-07-20
Payer: COMMERCIAL

## 2025-07-20 ENCOUNTER — ANESTHESIA (OUTPATIENT)
Facility: HOSPITAL | Age: 50
DRG: 336 | End: 2025-07-20
Payer: COMMERCIAL

## 2025-07-20 LAB
ALBUMIN SERPL-MCNC: 3.4 G/DL (ref 3.5–5)
ALBUMIN/GLOB SERPL: 1 (ref 1.1–2.2)
ALP SERPL-CCNC: 73 U/L (ref 45–117)
ALT SERPL-CCNC: 35 U/L (ref 12–78)
ANION GAP SERPL CALC-SCNC: 8 MMOL/L (ref 2–12)
AST SERPL W P-5'-P-CCNC: 34 U/L (ref 15–37)
BASOPHILS # BLD: 0.03 K/UL (ref 0–0.1)
BASOPHILS NFR BLD: 0.3 % (ref 0–1)
BILIRUB SERPL-MCNC: 0.6 MG/DL (ref 0.2–1)
BUN SERPL-MCNC: 8 MG/DL (ref 6–20)
BUN/CREAT SERPL: 12 (ref 12–20)
CA-I BLD-MCNC: 9.2 MG/DL (ref 8.5–10.1)
CHLORIDE SERPL-SCNC: 106 MMOL/L (ref 97–108)
CO2 SERPL-SCNC: 26 MMOL/L (ref 21–32)
CREAT SERPL-MCNC: 0.65 MG/DL (ref 0.55–1.02)
DIFFERENTIAL METHOD BLD: ABNORMAL
EOSINOPHIL # BLD: 0.01 K/UL (ref 0–0.4)
EOSINOPHIL NFR BLD: 0.1 % (ref 0–7)
ERYTHROCYTE [DISTWIDTH] IN BLOOD BY AUTOMATED COUNT: 12.9 % (ref 11.5–14.5)
GLOBULIN SER CALC-MCNC: 3.4 G/DL (ref 2–4)
GLUCOSE SERPL-MCNC: 111 MG/DL (ref 65–100)
HCT VFR BLD AUTO: 39.4 % (ref 35–47)
HGB BLD-MCNC: 12.8 G/DL (ref 11.5–16)
IMM GRANULOCYTES # BLD AUTO: 0.03 K/UL (ref 0–0.04)
IMM GRANULOCYTES NFR BLD AUTO: 0.3 % (ref 0–0.5)
LYMPHOCYTES # BLD: 1.19 K/UL (ref 0.8–3.5)
LYMPHOCYTES NFR BLD: 12.6 % (ref 12–49)
MAGNESIUM SERPL-MCNC: 1.9 MG/DL (ref 1.6–2.4)
MCH RBC QN AUTO: 28.5 PG (ref 26–34)
MCHC RBC AUTO-ENTMCNC: 32.5 G/DL (ref 30–36.5)
MCV RBC AUTO: 87.8 FL (ref 80–99)
MONOCYTES # BLD: 0.78 K/UL (ref 0–1)
MONOCYTES NFR BLD: 8.2 % (ref 5–13)
NEUTS SEG # BLD: 7.43 K/UL (ref 1.8–8)
NEUTS SEG NFR BLD: 78.5 % (ref 32–75)
NRBC # BLD: 0 K/UL (ref 0–0.01)
NRBC BLD-RTO: 0 PER 100 WBC
PLATELET # BLD AUTO: 297 K/UL (ref 150–400)
PMV BLD AUTO: 11.2 FL (ref 8.9–12.9)
POTASSIUM SERPL-SCNC: 3.2 MMOL/L (ref 3.5–5.1)
POTASSIUM SERPL-SCNC: 3.3 MMOL/L (ref 3.5–5.1)
PROT SERPL-MCNC: 6.8 G/DL (ref 6.4–8.2)
RBC # BLD AUTO: 4.49 M/UL (ref 3.8–5.2)
SODIUM SERPL-SCNC: 140 MMOL/L (ref 136–145)
WBC # BLD AUTO: 9.5 K/UL (ref 3.6–11)

## 2025-07-20 PROCEDURE — 6360000002 HC RX W HCPCS: Performed by: ANESTHESIOLOGY

## 2025-07-20 PROCEDURE — 1100000000 HC RM PRIVATE

## 2025-07-20 PROCEDURE — P9045 ALBUMIN (HUMAN), 5%, 250 ML: HCPCS | Performed by: NURSE ANESTHETIST, CERTIFIED REGISTERED

## 2025-07-20 PROCEDURE — 0DNW0ZZ RELEASE PERITONEUM, OPEN APPROACH: ICD-10-PCS | Performed by: SURGERY

## 2025-07-20 PROCEDURE — 7100000000 HC PACU RECOVERY - FIRST 15 MIN: Performed by: SURGERY

## 2025-07-20 PROCEDURE — 2580000003 HC RX 258: Performed by: SURGERY

## 2025-07-20 PROCEDURE — 80053 COMPREHEN METABOLIC PANEL: CPT

## 2025-07-20 PROCEDURE — 87086 URINE CULTURE/COLONY COUNT: CPT

## 2025-07-20 PROCEDURE — 7100000001 HC PACU RECOVERY - ADDTL 15 MIN: Performed by: SURGERY

## 2025-07-20 PROCEDURE — 6360000002 HC RX W HCPCS

## 2025-07-20 PROCEDURE — 2580000003 HC RX 258: Performed by: NURSE ANESTHETIST, CERTIFIED REGISTERED

## 2025-07-20 PROCEDURE — 6360000002 HC RX W HCPCS: Performed by: INTERNAL MEDICINE

## 2025-07-20 PROCEDURE — 2500000003 HC RX 250 WO HCPCS: Performed by: ANESTHESIOLOGY

## 2025-07-20 PROCEDURE — 2709999900 HC NON-CHARGEABLE SUPPLY: Performed by: SURGERY

## 2025-07-20 PROCEDURE — 6360000002 HC RX W HCPCS: Performed by: NURSE ANESTHETIST, CERTIFIED REGISTERED

## 2025-07-20 PROCEDURE — 84132 ASSAY OF SERUM POTASSIUM: CPT

## 2025-07-20 PROCEDURE — 3700000001 HC ADD 15 MINUTES (ANESTHESIA): Performed by: SURGERY

## 2025-07-20 PROCEDURE — 6360000002 HC RX W HCPCS: Performed by: NURSE PRACTITIONER

## 2025-07-20 PROCEDURE — 36415 COLL VENOUS BLD VENIPUNCTURE: CPT

## 2025-07-20 PROCEDURE — 6360000002 HC RX W HCPCS: Performed by: SURGERY

## 2025-07-20 PROCEDURE — 99231 SBSQ HOSP IP/OBS SF/LOW 25: CPT | Performed by: SURGERY

## 2025-07-20 PROCEDURE — 3600000004 HC SURGERY LEVEL 4 BASE: Performed by: SURGERY

## 2025-07-20 PROCEDURE — 83735 ASSAY OF MAGNESIUM: CPT

## 2025-07-20 PROCEDURE — 2500000003 HC RX 250 WO HCPCS: Performed by: NURSE ANESTHETIST, CERTIFIED REGISTERED

## 2025-07-20 PROCEDURE — 3700000000 HC ANESTHESIA ATTENDED CARE: Performed by: SURGERY

## 2025-07-20 PROCEDURE — 3600000014 HC SURGERY LEVEL 4 ADDTL 15MIN: Performed by: SURGERY

## 2025-07-20 PROCEDURE — 2500000003 HC RX 250 WO HCPCS: Performed by: INTERNAL MEDICINE

## 2025-07-20 PROCEDURE — 85025 COMPLETE CBC W/AUTO DIFF WBC: CPT

## 2025-07-20 RX ORDER — SODIUM CHLORIDE 0.9 % (FLUSH) 0.9 %
5-40 SYRINGE (ML) INJECTION EVERY 12 HOURS SCHEDULED
Status: DISCONTINUED | OUTPATIENT
Start: 2025-07-20 | End: 2025-07-20 | Stop reason: HOSPADM

## 2025-07-20 RX ORDER — OXYCODONE HYDROCHLORIDE 5 MG/1
10 TABLET ORAL PRN
Status: DISCONTINUED | OUTPATIENT
Start: 2025-07-20 | End: 2025-07-20 | Stop reason: HOSPADM

## 2025-07-20 RX ORDER — MEPERIDINE HYDROCHLORIDE 25 MG/ML
12.5 INJECTION INTRAMUSCULAR; INTRAVENOUS; SUBCUTANEOUS EVERY 5 MIN PRN
Status: DISCONTINUED | OUTPATIENT
Start: 2025-07-20 | End: 2025-07-20 | Stop reason: HOSPADM

## 2025-07-20 RX ORDER — LABETALOL HYDROCHLORIDE 5 MG/ML
10 INJECTION, SOLUTION INTRAVENOUS
Status: DISCONTINUED | OUTPATIENT
Start: 2025-07-20 | End: 2025-07-20 | Stop reason: HOSPADM

## 2025-07-20 RX ORDER — LIDOCAINE HYDROCHLORIDE 20 MG/ML
INJECTION, SOLUTION EPIDURAL; INFILTRATION; INTRACAUDAL; PERINEURAL
Status: DISCONTINUED | OUTPATIENT
Start: 2025-07-20 | End: 2025-07-20 | Stop reason: SDUPTHER

## 2025-07-20 RX ORDER — METOCLOPRAMIDE HYDROCHLORIDE 5 MG/ML
10 INJECTION INTRAMUSCULAR; INTRAVENOUS
Status: DISCONTINUED | OUTPATIENT
Start: 2025-07-20 | End: 2025-07-20 | Stop reason: HOSPADM

## 2025-07-20 RX ORDER — DIPHENHYDRAMINE HYDROCHLORIDE 50 MG/ML
12.5 INJECTION, SOLUTION INTRAMUSCULAR; INTRAVENOUS
Status: DISCONTINUED | OUTPATIENT
Start: 2025-07-20 | End: 2025-07-20 | Stop reason: HOSPADM

## 2025-07-20 RX ORDER — SODIUM CHLORIDE 0.9 % (FLUSH) 0.9 %
5-40 SYRINGE (ML) INJECTION PRN
Status: DISCONTINUED | OUTPATIENT
Start: 2025-07-20 | End: 2025-07-20 | Stop reason: HOSPADM

## 2025-07-20 RX ORDER — CEFAZOLIN SODIUM 1 G/3ML
INJECTION, POWDER, FOR SOLUTION INTRAMUSCULAR; INTRAVENOUS
Status: DISCONTINUED | OUTPATIENT
Start: 2025-07-20 | End: 2025-07-20 | Stop reason: SDUPTHER

## 2025-07-20 RX ORDER — FENTANYL CITRATE 0.05 MG/ML
50 INJECTION, SOLUTION INTRAMUSCULAR; INTRAVENOUS EVERY 5 MIN PRN
Status: DISCONTINUED | OUTPATIENT
Start: 2025-07-20 | End: 2025-07-20 | Stop reason: HOSPADM

## 2025-07-20 RX ORDER — SUCCINYLCHOLINE/SOD CL,ISO/PF 200MG/10ML
SYRINGE (ML) INTRAVENOUS
Status: DISCONTINUED | OUTPATIENT
Start: 2025-07-20 | End: 2025-07-20 | Stop reason: SDUPTHER

## 2025-07-20 RX ORDER — HYDRALAZINE HYDROCHLORIDE 20 MG/ML
10 INJECTION INTRAMUSCULAR; INTRAVENOUS
Status: DISCONTINUED | OUTPATIENT
Start: 2025-07-20 | End: 2025-07-20 | Stop reason: HOSPADM

## 2025-07-20 RX ORDER — ALBUMIN HUMAN 50 G/1000ML
SOLUTION INTRAVENOUS
Status: DISCONTINUED | OUTPATIENT
Start: 2025-07-20 | End: 2025-07-20

## 2025-07-20 RX ORDER — ONDANSETRON 2 MG/ML
4 INJECTION INTRAMUSCULAR; INTRAVENOUS
Status: DISCONTINUED | OUTPATIENT
Start: 2025-07-20 | End: 2025-07-20 | Stop reason: HOSPADM

## 2025-07-20 RX ORDER — ESMOLOL HYDROCHLORIDE 10 MG/ML
INJECTION INTRAVENOUS
Status: DISCONTINUED | OUTPATIENT
Start: 2025-07-20 | End: 2025-07-20 | Stop reason: SDUPTHER

## 2025-07-20 RX ORDER — PHENYLEPHRINE HCL IN 0.9% NACL 1 MG/10 ML
SYRINGE (ML) INTRAVENOUS
Status: DISCONTINUED | OUTPATIENT
Start: 2025-07-20 | End: 2025-07-20 | Stop reason: SDUPTHER

## 2025-07-20 RX ORDER — ROCURONIUM BROMIDE 10 MG/ML
INJECTION, SOLUTION INTRAVENOUS
Status: DISCONTINUED | OUTPATIENT
Start: 2025-07-20 | End: 2025-07-20 | Stop reason: SDUPTHER

## 2025-07-20 RX ORDER — IPRATROPIUM BROMIDE AND ALBUTEROL SULFATE 2.5; .5 MG/3ML; MG/3ML
1 SOLUTION RESPIRATORY (INHALATION)
Status: DISCONTINUED | OUTPATIENT
Start: 2025-07-20 | End: 2025-07-20 | Stop reason: HOSPADM

## 2025-07-20 RX ORDER — SODIUM CHLORIDE, SODIUM LACTATE, POTASSIUM CHLORIDE, CALCIUM CHLORIDE 600; 310; 30; 20 MG/100ML; MG/100ML; MG/100ML; MG/100ML
INJECTION, SOLUTION INTRAVENOUS
Status: DISCONTINUED | OUTPATIENT
Start: 2025-07-20 | End: 2025-07-20 | Stop reason: SDUPTHER

## 2025-07-20 RX ORDER — POTASSIUM CHLORIDE 7.45 MG/ML
10 INJECTION INTRAVENOUS
Status: COMPLETED | OUTPATIENT
Start: 2025-07-20 | End: 2025-07-20

## 2025-07-20 RX ORDER — SODIUM CHLORIDE, SODIUM LACTATE, POTASSIUM CHLORIDE, CALCIUM CHLORIDE 600; 310; 30; 20 MG/100ML; MG/100ML; MG/100ML; MG/100ML
INJECTION, SOLUTION INTRAVENOUS ONCE
Status: DISCONTINUED | OUTPATIENT
Start: 2025-07-20 | End: 2025-07-20 | Stop reason: HOSPADM

## 2025-07-20 RX ORDER — LORAZEPAM 2 MG/ML
0.5 INJECTION INTRAMUSCULAR
Status: DISCONTINUED | OUTPATIENT
Start: 2025-07-20 | End: 2025-07-20 | Stop reason: HOSPADM

## 2025-07-20 RX ORDER — CEFAZOLIN SODIUM 1 G/3ML
INJECTION, POWDER, FOR SOLUTION INTRAMUSCULAR; INTRAVENOUS
Status: COMPLETED
Start: 2025-07-20 | End: 2025-07-20

## 2025-07-20 RX ORDER — DEXAMETHASONE SODIUM PHOSPHATE 4 MG/ML
INJECTION, SOLUTION INTRA-ARTICULAR; INTRALESIONAL; INTRAMUSCULAR; INTRAVENOUS; SOFT TISSUE
Status: DISCONTINUED | OUTPATIENT
Start: 2025-07-20 | End: 2025-07-20 | Stop reason: SDUPTHER

## 2025-07-20 RX ORDER — OXYCODONE HYDROCHLORIDE 5 MG/1
5 TABLET ORAL PRN
Status: DISCONTINUED | OUTPATIENT
Start: 2025-07-20 | End: 2025-07-20 | Stop reason: HOSPADM

## 2025-07-20 RX ORDER — PROPOFOL 10 MG/ML
INJECTION, EMULSION INTRAVENOUS
Status: DISCONTINUED | OUTPATIENT
Start: 2025-07-20 | End: 2025-07-20 | Stop reason: SDUPTHER

## 2025-07-20 RX ORDER — SODIUM CHLORIDE 9 MG/ML
INJECTION, SOLUTION INTRAVENOUS PRN
Status: DISCONTINUED | OUTPATIENT
Start: 2025-07-20 | End: 2025-07-20 | Stop reason: HOSPADM

## 2025-07-20 RX ORDER — MIDAZOLAM HYDROCHLORIDE 1 MG/ML
INJECTION, SOLUTION INTRAMUSCULAR; INTRAVENOUS
Status: DISCONTINUED | OUTPATIENT
Start: 2025-07-20 | End: 2025-07-20 | Stop reason: SDUPTHER

## 2025-07-20 RX ORDER — FENTANYL CITRATE 50 UG/ML
INJECTION, SOLUTION INTRAMUSCULAR; INTRAVENOUS
Status: DISCONTINUED | OUTPATIENT
Start: 2025-07-20 | End: 2025-07-20 | Stop reason: SDUPTHER

## 2025-07-20 RX ORDER — HYDROMORPHONE HYDROCHLORIDE 1 MG/ML
0.5 INJECTION, SOLUTION INTRAMUSCULAR; INTRAVENOUS; SUBCUTANEOUS EVERY 5 MIN PRN
Status: DISCONTINUED | OUTPATIENT
Start: 2025-07-20 | End: 2025-07-20 | Stop reason: HOSPADM

## 2025-07-20 RX ORDER — DEXTROSE MONOHYDRATE 100 MG/ML
INJECTION, SOLUTION INTRAVENOUS CONTINUOUS PRN
Status: DISCONTINUED | OUTPATIENT
Start: 2025-07-20 | End: 2025-07-20 | Stop reason: HOSPADM

## 2025-07-20 RX ORDER — GLUCAGON 1 MG/ML
1 KIT INJECTION PRN
Status: DISCONTINUED | OUTPATIENT
Start: 2025-07-20 | End: 2025-07-20 | Stop reason: HOSPADM

## 2025-07-20 RX ORDER — LIDOCAINE 4 G/G
1 PATCH TOPICAL AS NEEDED
Status: DISCONTINUED | OUTPATIENT
Start: 2025-07-20 | End: 2025-07-20 | Stop reason: HOSPADM

## 2025-07-20 RX ORDER — ALBUMIN HUMAN 50 G/1000ML
SOLUTION INTRAVENOUS
Status: DISCONTINUED | OUTPATIENT
Start: 2025-07-20 | End: 2025-07-20 | Stop reason: SDUPTHER

## 2025-07-20 RX ORDER — ONDANSETRON 2 MG/ML
INJECTION INTRAMUSCULAR; INTRAVENOUS
Status: DISCONTINUED | OUTPATIENT
Start: 2025-07-20 | End: 2025-07-20 | Stop reason: SDUPTHER

## 2025-07-20 RX ADMIN — PANTOPRAZOLE SODIUM 40 MG: 40 INJECTION, POWDER, FOR SOLUTION INTRAVENOUS at 20:19

## 2025-07-20 RX ADMIN — MORPHINE SULFATE 2 MG: 2 INJECTION, SOLUTION INTRAMUSCULAR; INTRAVENOUS at 07:10

## 2025-07-20 RX ADMIN — SODIUM CHLORIDE, PRESERVATIVE FREE 10 ML: 5 INJECTION INTRAVENOUS at 20:23

## 2025-07-20 RX ADMIN — ROCURONIUM BROMIDE 50 MG: 10 INJECTION, SOLUTION INTRAVENOUS at 15:49

## 2025-07-20 RX ADMIN — HYDROMORPHONE HYDROCHLORIDE 1 MG: 1 INJECTION, SOLUTION INTRAMUSCULAR; INTRAVENOUS; SUBCUTANEOUS at 15:41

## 2025-07-20 RX ADMIN — HYDRALAZINE HYDROCHLORIDE 10 MG: 20 INJECTION, SOLUTION INTRAMUSCULAR; INTRAVENOUS at 07:13

## 2025-07-20 RX ADMIN — ONDANSETRON 4 MG: 2 INJECTION INTRAMUSCULAR; INTRAVENOUS at 07:10

## 2025-07-20 RX ADMIN — FENTANYL CITRATE 50 MCG: 50 INJECTION INTRAMUSCULAR; INTRAVENOUS at 17:35

## 2025-07-20 RX ADMIN — MIDAZOLAM 2 MG: 1 INJECTION INTRAMUSCULAR; INTRAVENOUS at 15:34

## 2025-07-20 RX ADMIN — HYDRALAZINE HYDROCHLORIDE 10 MG: 20 INJECTION, SOLUTION INTRAMUSCULAR; INTRAVENOUS at 12:21

## 2025-07-20 RX ADMIN — Medication 300 MCG: at 16:02

## 2025-07-20 RX ADMIN — MORPHINE SULFATE 2 MG: 2 INJECTION, SOLUTION INTRAMUSCULAR; INTRAVENOUS at 18:30

## 2025-07-20 RX ADMIN — PANTOPRAZOLE SODIUM 40 MG: 40 INJECTION, POWDER, FOR SOLUTION INTRAVENOUS at 10:21

## 2025-07-20 RX ADMIN — Medication 300 MCG: at 16:11

## 2025-07-20 RX ADMIN — ENOXAPARIN SODIUM 30 MG: 100 INJECTION SUBCUTANEOUS at 10:22

## 2025-07-20 RX ADMIN — ALBUMIN (HUMAN) 12.5 G: 12.5 INJECTION, SOLUTION INTRAVENOUS at 16:42

## 2025-07-20 RX ADMIN — MORPHINE SULFATE 2 MG: 2 INJECTION, SOLUTION INTRAMUSCULAR; INTRAVENOUS at 22:48

## 2025-07-20 RX ADMIN — ONDANSETRON 4 MG: 2 INJECTION INTRAMUSCULAR; INTRAVENOUS at 15:57

## 2025-07-20 RX ADMIN — SODIUM CHLORIDE, PRESERVATIVE FREE 10 ML: 5 INJECTION INTRAVENOUS at 09:22

## 2025-07-20 RX ADMIN — PROCHLORPERAZINE EDISYLATE 10 MG: 5 INJECTION INTRAMUSCULAR; INTRAVENOUS at 22:58

## 2025-07-20 RX ADMIN — Medication 300 MCG: at 15:56

## 2025-07-20 RX ADMIN — LIDOCAINE HYDROCHLORIDE 100 MG: 20 SOLUTION INTRAVENOUS at 15:40

## 2025-07-20 RX ADMIN — PROPOFOL 200 MG: 10 INJECTION, EMULSION INTRAVENOUS at 15:40

## 2025-07-20 RX ADMIN — POTASSIUM CHLORIDE 10 MEQ: 7.46 INJECTION, SOLUTION INTRAVENOUS at 08:24

## 2025-07-20 RX ADMIN — ESMOLOL HYDROCHLORIDE 20 MG: 10 INJECTION, SOLUTION INTRAVENOUS at 16:27

## 2025-07-20 RX ADMIN — PROCHLORPERAZINE EDISYLATE 10 MG: 5 INJECTION INTRAMUSCULAR; INTRAVENOUS at 12:16

## 2025-07-20 RX ADMIN — ALBUMIN (HUMAN) 12.5 G: 12.5 INJECTION, SOLUTION INTRAVENOUS at 16:10

## 2025-07-20 RX ADMIN — CEFAZOLIN 3 G: 1 INJECTION, POWDER, FOR SOLUTION INTRAMUSCULAR; INTRAVENOUS at 15:38

## 2025-07-20 RX ADMIN — DEXAMETHASONE SODIUM PHOSPHATE 4 MG: 4 INJECTION, SOLUTION INTRA-ARTICULAR; INTRALESIONAL; INTRAMUSCULAR; INTRAVENOUS; SOFT TISSUE at 15:57

## 2025-07-20 RX ADMIN — SUGAMMADEX 200 MG: 100 INJECTION, SOLUTION INTRAVENOUS at 16:55

## 2025-07-20 RX ADMIN — POTASSIUM CHLORIDE 10 MEQ: 7.46 INJECTION, SOLUTION INTRAVENOUS at 10:21

## 2025-07-20 RX ADMIN — Medication 200 MCG: at 16:07

## 2025-07-20 RX ADMIN — Medication 200 MCG: at 15:52

## 2025-07-20 RX ADMIN — Medication 120 MG: at 15:40

## 2025-07-20 RX ADMIN — POTASSIUM CHLORIDE 10 MEQ: 7.46 INJECTION, SOLUTION INTRAVENOUS at 09:21

## 2025-07-20 RX ADMIN — SODIUM CHLORIDE, SODIUM LACTATE, POTASSIUM CHLORIDE, AND CALCIUM CHLORIDE: .6; .31; .03; .02 INJECTION, SOLUTION INTRAVENOUS at 18:43

## 2025-07-20 RX ADMIN — MORPHINE SULFATE 2 MG: 2 INJECTION, SOLUTION INTRAMUSCULAR; INTRAVENOUS at 00:11

## 2025-07-20 RX ADMIN — SODIUM CHLORIDE, POTASSIUM CHLORIDE, SODIUM LACTATE AND CALCIUM CHLORIDE: 600; 310; 30; 20 INJECTION, SOLUTION INTRAVENOUS at 15:34

## 2025-07-20 RX ADMIN — Medication 200 MCG: at 16:47

## 2025-07-20 RX ADMIN — MORPHINE SULFATE 2 MG: 2 INJECTION, SOLUTION INTRAMUSCULAR; INTRAVENOUS at 12:15

## 2025-07-20 RX ADMIN — ENOXAPARIN SODIUM 30 MG: 100 INJECTION SUBCUTANEOUS at 20:18

## 2025-07-20 RX ADMIN — FENTANYL CITRATE 100 MCG: 50 INJECTION INTRAMUSCULAR; INTRAVENOUS at 15:40

## 2025-07-20 ASSESSMENT — PAIN SCALES - WONG BAKER
WONGBAKER_NUMERICALRESPONSE: NO HURT

## 2025-07-20 ASSESSMENT — PAIN DESCRIPTION - LOCATION
LOCATION: ABDOMEN
LOCATION: ABDOMEN;INCISION

## 2025-07-20 ASSESSMENT — PAIN - FUNCTIONAL ASSESSMENT
PAIN_FUNCTIONAL_ASSESSMENT: PREVENTS OR INTERFERES SOME ACTIVE ACTIVITIES AND ADLS
PAIN_FUNCTIONAL_ASSESSMENT: PREVENTS OR INTERFERES SOME ACTIVE ACTIVITIES AND ADLS

## 2025-07-20 ASSESSMENT — PAIN SCALES - GENERAL
PAINLEVEL_OUTOF10: 5
PAINLEVEL_OUTOF10: 7
PAINLEVEL_OUTOF10: 4
PAINLEVEL_OUTOF10: 4
PAINLEVEL_OUTOF10: 7
PAINLEVEL_OUTOF10: 0
PAINLEVEL_OUTOF10: 6
PAINLEVEL_OUTOF10: 0
PAINLEVEL_OUTOF10: 4
PAINLEVEL_OUTOF10: 7
PAINLEVEL_OUTOF10: 0
PAINLEVEL_OUTOF10: 6
PAINLEVEL_OUTOF10: 7
PAINLEVEL_OUTOF10: 3
PAINLEVEL_OUTOF10: 7
PAINLEVEL_OUTOF10: 4
PAINLEVEL_OUTOF10: 0
PAINLEVEL_OUTOF10: 0
PAINLEVEL_OUTOF10: 7
PAINLEVEL_OUTOF10: 3

## 2025-07-20 ASSESSMENT — PAIN DESCRIPTION - DESCRIPTORS
DESCRIPTORS: ACHING;THROBBING;DISCOMFORT
DESCRIPTORS: SORE
DESCRIPTORS: SORE
DESCRIPTORS: CRAMPING;ACHING;PRESSURE
DESCRIPTORS: SORE;ACHING

## 2025-07-20 ASSESSMENT — PAIN DESCRIPTION - ORIENTATION
ORIENTATION: MID

## 2025-07-20 ASSESSMENT — PAIN DESCRIPTION - PAIN TYPE: TYPE: SURGICAL PAIN

## 2025-07-20 NOTE — PLAN OF CARE
Problem: Discharge Planning  Goal: Discharge to home or other facility with appropriate resources  Outcome: Progressing  Flowsheets (Taken 7/20/2025 0111)  Discharge to home or other facility with appropriate resources:   Identify barriers to discharge with patient and caregiver   Arrange for needed discharge resources and transportation as appropriate   Identify discharge learning needs (meds, wound care, etc)     Problem: Pain  Goal: Verbalizes/displays adequate comfort level or baseline comfort level  Outcome: Progressing  Flowsheets (Taken 7/20/2025 0111)  Verbalizes/displays adequate comfort level or baseline comfort level:   Encourage patient to monitor pain and request assistance   Assess pain using appropriate pain scale   Administer analgesics based on type and severity of pain and evaluate response     Problem: Safety - Adult  Goal: Free from fall injury  Outcome: Progressing  Flowsheets (Taken 7/20/2025 0111)  Free From Fall Injury:   Instruct family/caregiver on patient safety   Based on caregiver fall risk screen, instruct family/caregiver to ask for assistance with transferring infant if caregiver noted to have fall risk factors

## 2025-07-20 NOTE — ANESTHESIA PRE PROCEDURE
Department of Anesthesiology  Preprocedure Note       Name:  Liz Simms   Age:  49 y.o.  :  1975                                          MRN:  [id number]         Date:  2025      Surgeon: Surgeon(s):  Francis Cisneros MD    Procedure: Procedure(s):  LAPAROTOMY EXPLORATORY WITH LYSIS OF ADHESIONS    Medications prior to admission:   Prior to Admission medications    Medication Sig Start Date End Date Taking? Authorizing Provider   Semaglutide-Weight Management (WEGOVY) 1 MG/0.5ML SOAJ SC injection Inject 1 mg into the skin every 7 days   Yes Provider, MD Jacinto   albuterol sulfate HFA (PROVENTIL;VENTOLIN;PROAIR) 108 (90 Base) MCG/ACT inhaler Inhale 2 puffs into the lungs every 4 hours as needed 10/15/20  Yes Automatic Reconciliation, Ar   budesonide-formoterol (SYMBICORT) 160-4.5 MCG/ACT AERO Inhale 2 puffs into the lungs 2 times daily   Yes Automatic Reconciliation, Ar   ibuprofen (ADVIL;MOTRIN) 800 MG tablet Take by mouth every 6 hours as needed 19  Yes Automatic Reconciliation, Ar       Current medications:    Current Facility-Administered Medications   Medication Dose Route Frequency Provider Last Rate Last Admin    ceFAZolin (ANCEF) 2,000 mg in sterile water 20 mL IV syringe  2,000 mg IntraVENous Once Francis Cisneros MD        ceFAZolin (ANCEF) 1 g injection             morphine (PF) injection 2 mg  2 mg IntraVENous Q4H PRN Francis Cisneros MD   2 mg at 25 1215    hydrALAZINE (APRESOLINE) injection 10 mg  10 mg IntraVENous Q6H PRN Latia Whalen APRN - NP   10 mg at 25 1221    pantoprazole (PROTONIX) injection 40 mg  40 mg IntraVENous BID Agnes Christian ACNP   40 mg at 25 1021    sodium chloride flush 0.9 % injection 5-40 mL  5-40 mL IntraVENous 2 times per day Mesfin Kong MD   10 mL at 25 0922    sodium chloride flush 0.9 % injection 5-40 mL  5-40 mL IntraVENous PRN Mesfin Kong MD   10 mL at 25 1044    0.9 %    Diagnosis Date    H/O seasonal allergies     History of heavy vaginal bleeding     Sinusitis     Vaginal fibroids        Past Surgical History:        Procedure Laterality Date    IR EMBOLIZATION TUMOR/ORGAN      MYOMECTOMY      X2        Social History:    Social History     Tobacco Use    Smoking status: Never    Smokeless tobacco: Never   Substance Use Topics    Alcohol use: Yes                                Counseling given: Not Answered      Vital Signs (Current):   Vitals:    07/20/25 1215 07/20/25 1245 07/20/25 1430 07/20/25 1450   BP: (!) 175/93  (!) 146/79 (!) 162/87   Pulse: 98  (!) 105 97   Resp: 18 18  17   Temp: 100 °F (37.8 °C)   99.1 °F (37.3 °C)   TempSrc: Oral   Oral   SpO2:    95%   Weight:       Height:                                                  BP Readings from Last 3 Encounters:   07/20/25 (!) 162/87       NPO Status: Time of last liquid consumption: 1700                        Time of last solid consumption: 1700                        Date of last liquid consumption: 07/16/25                        Date of last solid food consumption: 07/16/25    BMI:   Wt Readings from Last 3 Encounters:   07/17/25 124.7 kg (275 lb)     Body mass index is 40.61 kg/m².    CBC:   Lab Results   Component Value Date/Time    WBC 9.5 07/20/2025 02:08 AM    RBC 4.49 07/20/2025 02:08 AM    HGB 12.8 07/20/2025 02:08 AM    HCT 39.4 07/20/2025 02:08 AM    MCV 87.8 07/20/2025 02:08 AM    RDW 12.9 07/20/2025 02:08 AM     07/20/2025 02:08 AM       CMP:   Lab Results   Component Value Date/Time     07/20/2025 02:08 AM    K 3.2 07/20/2025 02:08 AM     07/20/2025 02:08 AM    CO2 26 07/20/2025 02:08 AM    BUN 8 07/20/2025 02:08 AM    CREATININE 0.65 07/20/2025 02:08 AM    LABGLOM >90 07/20/2025 02:08 AM    GLUCOSE 111 07/20/2025 02:08 AM    CALCIUM 9.2 07/20/2025 02:08 AM    BILITOT 0.6 07/20/2025 02:08 AM    ALKPHOS 73 07/20/2025 02:08 AM    AST 34 07/20/2025 02:08 AM    ALT 35 07/20/2025 02:08 AM

## 2025-07-20 NOTE — PLAN OF CARE
Problem: Pain  Goal: Verbalizes/displays adequate comfort level or baseline comfort level  7/20/2025 1210 by Eun Wakefield, RN  Outcome: Progressing  7/20/2025 0111 by Chloé Lopez RN  Outcome: Progressing  Flowsheets (Taken 7/20/2025 0111)  Verbalizes/displays adequate comfort level or baseline comfort level:   Encourage patient to monitor pain and request assistance   Assess pain using appropriate pain scale   Administer analgesics based on type and severity of pain and evaluate response     Problem: Safety - Adult  Goal: Free from fall injury  7/20/2025 0111 by Chloé Lopez, RN  Outcome: Progressing  Flowsheets (Taken 7/20/2025 0111)  Free From Fall Injury:   Instruct family/caregiver on patient safety   Based on caregiver fall risk screen, instruct family/caregiver to ask for assistance with transferring infant if caregiver noted to have fall risk factors

## 2025-07-21 ENCOUNTER — APPOINTMENT (OUTPATIENT)
Facility: HOSPITAL | Age: 50
DRG: 336 | End: 2025-07-21
Payer: COMMERCIAL

## 2025-07-21 LAB
ALBUMIN SERPL-MCNC: 3 G/DL (ref 3.5–5)
ALBUMIN/GLOB SERPL: 1 (ref 1.1–2.2)
ALP SERPL-CCNC: 62 U/L (ref 45–117)
ALT SERPL-CCNC: 30 U/L (ref 12–78)
ANION GAP SERPL CALC-SCNC: 11 MMOL/L (ref 2–12)
AST SERPL W P-5'-P-CCNC: 31 U/L (ref 15–37)
BASOPHILS # BLD: 0.03 K/UL (ref 0–0.1)
BASOPHILS NFR BLD: 0.3 % (ref 0–1)
BILIRUB SERPL-MCNC: 1 MG/DL (ref 0.2–1)
BNP SERPL-MCNC: 125 PG/ML
BUN SERPL-MCNC: 8 MG/DL (ref 6–20)
BUN/CREAT SERPL: 14 (ref 12–20)
CA-I BLD-MCNC: 8.8 MG/DL (ref 8.5–10.1)
CHLORIDE SERPL-SCNC: 110 MMOL/L (ref 97–108)
CO2 SERPL-SCNC: 20 MMOL/L (ref 21–32)
CREAT SERPL-MCNC: 0.56 MG/DL (ref 0.55–1.02)
DIFFERENTIAL METHOD BLD: ABNORMAL
EOSINOPHIL # BLD: 0.02 K/UL (ref 0–0.4)
EOSINOPHIL NFR BLD: 0.2 % (ref 0–7)
ERYTHROCYTE [DISTWIDTH] IN BLOOD BY AUTOMATED COUNT: 13 % (ref 11.5–14.5)
GLOBULIN SER CALC-MCNC: 2.9 G/DL (ref 2–4)
GLUCOSE SERPL-MCNC: 106 MG/DL (ref 65–100)
HCT VFR BLD AUTO: 40.1 % (ref 35–47)
HGB BLD-MCNC: 13.2 G/DL (ref 11.5–16)
IMM GRANULOCYTES # BLD AUTO: 0.07 K/UL (ref 0–0.04)
IMM GRANULOCYTES NFR BLD AUTO: 0.7 % (ref 0–0.5)
LYMPHOCYTES # BLD: 1.32 K/UL (ref 0.8–3.5)
LYMPHOCYTES NFR BLD: 12.6 % (ref 12–49)
MAGNESIUM SERPL-MCNC: 1.9 MG/DL (ref 1.6–2.4)
MCH RBC QN AUTO: 28.9 PG (ref 26–34)
MCHC RBC AUTO-ENTMCNC: 32.9 G/DL (ref 30–36.5)
MCV RBC AUTO: 87.7 FL (ref 80–99)
MONOCYTES # BLD: 0.92 K/UL (ref 0–1)
MONOCYTES NFR BLD: 8.8 % (ref 5–13)
NEUTS SEG # BLD: 8.15 K/UL (ref 1.8–8)
NEUTS SEG NFR BLD: 77.4 % (ref 32–75)
NRBC # BLD: 0 K/UL (ref 0–0.01)
NRBC BLD-RTO: 0 PER 100 WBC
PLATELET # BLD AUTO: 283 K/UL (ref 150–400)
PMV BLD AUTO: 11.1 FL (ref 8.9–12.9)
POTASSIUM SERPL-SCNC: 3 MMOL/L (ref 3.5–5.1)
POTASSIUM SERPL-SCNC: 3.4 MMOL/L (ref 3.5–5.1)
PROT SERPL-MCNC: 5.9 G/DL (ref 6.4–8.2)
RBC # BLD AUTO: 4.57 M/UL (ref 3.8–5.2)
SODIUM SERPL-SCNC: 141 MMOL/L (ref 136–145)
WBC # BLD AUTO: 10.5 K/UL (ref 3.6–11)

## 2025-07-21 PROCEDURE — 2580000003 HC RX 258: Performed by: SURGERY

## 2025-07-21 PROCEDURE — 2580000003 HC RX 258: Performed by: STUDENT IN AN ORGANIZED HEALTH CARE EDUCATION/TRAINING PROGRAM

## 2025-07-21 PROCEDURE — 83880 ASSAY OF NATRIURETIC PEPTIDE: CPT

## 2025-07-21 PROCEDURE — 2580000003 HC RX 258: Performed by: INTERNAL MEDICINE

## 2025-07-21 PROCEDURE — 2500000003 HC RX 250 WO HCPCS: Performed by: INTERNAL MEDICINE

## 2025-07-21 PROCEDURE — 6370000000 HC RX 637 (ALT 250 FOR IP): Performed by: INTERNAL MEDICINE

## 2025-07-21 PROCEDURE — 6360000002 HC RX W HCPCS: Performed by: NURSE PRACTITIONER

## 2025-07-21 PROCEDURE — 1100000000 HC RM PRIVATE

## 2025-07-21 PROCEDURE — 6360000002 HC RX W HCPCS: Performed by: STUDENT IN AN ORGANIZED HEALTH CARE EDUCATION/TRAINING PROGRAM

## 2025-07-21 PROCEDURE — 83735 ASSAY OF MAGNESIUM: CPT

## 2025-07-21 PROCEDURE — 99024 POSTOP FOLLOW-UP VISIT: CPT | Performed by: STUDENT IN AN ORGANIZED HEALTH CARE EDUCATION/TRAINING PROGRAM

## 2025-07-21 PROCEDURE — 84132 ASSAY OF SERUM POTASSIUM: CPT

## 2025-07-21 PROCEDURE — 6360000002 HC RX W HCPCS: Performed by: SURGERY

## 2025-07-21 PROCEDURE — 6360000002 HC RX W HCPCS

## 2025-07-21 PROCEDURE — 85025 COMPLETE CBC W/AUTO DIFF WBC: CPT

## 2025-07-21 PROCEDURE — 71045 X-RAY EXAM CHEST 1 VIEW: CPT

## 2025-07-21 PROCEDURE — 6360000002 HC RX W HCPCS: Performed by: INTERNAL MEDICINE

## 2025-07-21 PROCEDURE — 80053 COMPREHEN METABOLIC PANEL: CPT

## 2025-07-21 RX ORDER — SODIUM CHLORIDE, SODIUM LACTATE, POTASSIUM CHLORIDE, CALCIUM CHLORIDE 600; 310; 30; 20 MG/100ML; MG/100ML; MG/100ML; MG/100ML
INJECTION, SOLUTION INTRAVENOUS CONTINUOUS
Status: DISCONTINUED | OUTPATIENT
Start: 2025-07-21 | End: 2025-07-22

## 2025-07-21 RX ORDER — HYDROMORPHONE HYDROCHLORIDE 1 MG/ML
0.5 INJECTION, SOLUTION INTRAMUSCULAR; INTRAVENOUS; SUBCUTANEOUS
Status: DISPENSED | OUTPATIENT
Start: 2025-07-21 | End: 2025-07-24

## 2025-07-21 RX ORDER — POTASSIUM CHLORIDE 7.45 MG/ML
10 INJECTION INTRAVENOUS
Status: DISPENSED | OUTPATIENT
Start: 2025-07-21 | End: 2025-07-21

## 2025-07-21 RX ORDER — HYDROMORPHONE HYDROCHLORIDE 1 MG/ML
0.25 INJECTION, SOLUTION INTRAMUSCULAR; INTRAVENOUS; SUBCUTANEOUS
Status: DISPENSED | OUTPATIENT
Start: 2025-07-21 | End: 2025-07-24

## 2025-07-21 RX ADMIN — ACETAMINOPHEN 650 MG: 325 TABLET ORAL at 14:56

## 2025-07-21 RX ADMIN — PROCHLORPERAZINE EDISYLATE 10 MG: 5 INJECTION INTRAMUSCULAR; INTRAVENOUS at 04:45

## 2025-07-21 RX ADMIN — POTASSIUM CHLORIDE 10 MEQ: 7.46 INJECTION, SOLUTION INTRAVENOUS at 12:46

## 2025-07-21 RX ADMIN — PROCHLORPERAZINE EDISYLATE 10 MG: 5 INJECTION INTRAMUSCULAR; INTRAVENOUS at 15:05

## 2025-07-21 RX ADMIN — MORPHINE SULFATE 2 MG: 2 INJECTION, SOLUTION INTRAMUSCULAR; INTRAVENOUS at 08:47

## 2025-07-21 RX ADMIN — PANTOPRAZOLE SODIUM 40 MG: 40 INJECTION, POWDER, FOR SOLUTION INTRAVENOUS at 08:38

## 2025-07-21 RX ADMIN — PANTOPRAZOLE SODIUM 40 MG: 40 INJECTION, POWDER, FOR SOLUTION INTRAVENOUS at 20:19

## 2025-07-21 RX ADMIN — SODIUM CHLORIDE: 0.9 INJECTION, SOLUTION INTRAVENOUS at 09:00

## 2025-07-21 RX ADMIN — MORPHINE SULFATE 2 MG: 2 INJECTION, SOLUTION INTRAMUSCULAR; INTRAVENOUS at 04:13

## 2025-07-21 RX ADMIN — SODIUM CHLORIDE, SODIUM LACTATE, POTASSIUM CHLORIDE, AND CALCIUM CHLORIDE: .6; .31; .03; .02 INJECTION, SOLUTION INTRAVENOUS at 01:34

## 2025-07-21 RX ADMIN — SODIUM CHLORIDE, PRESERVATIVE FREE 10 ML: 5 INJECTION INTRAVENOUS at 20:20

## 2025-07-21 RX ADMIN — POTASSIUM CHLORIDE 10 MEQ: 7.46 INJECTION, SOLUTION INTRAVENOUS at 10:53

## 2025-07-21 RX ADMIN — SODIUM CHLORIDE, PRESERVATIVE FREE 10 ML: 5 INJECTION INTRAVENOUS at 08:39

## 2025-07-21 RX ADMIN — HYDROMORPHONE HYDROCHLORIDE 0.25 MG: 1 INJECTION, SOLUTION INTRAMUSCULAR; INTRAVENOUS; SUBCUTANEOUS at 14:51

## 2025-07-21 RX ADMIN — SODIUM CHLORIDE, SODIUM LACTATE, POTASSIUM CHLORIDE, AND CALCIUM CHLORIDE: .6; .31; .03; .02 INJECTION, SOLUTION INTRAVENOUS at 23:52

## 2025-07-21 RX ADMIN — POTASSIUM CHLORIDE 10 MEQ: 7.46 INJECTION, SOLUTION INTRAVENOUS at 09:01

## 2025-07-21 RX ADMIN — POTASSIUM CHLORIDE 10 MEQ: 7.46 INJECTION, SOLUTION INTRAVENOUS at 14:46

## 2025-07-21 RX ADMIN — SODIUM CHLORIDE, SODIUM LACTATE, POTASSIUM CHLORIDE, AND CALCIUM CHLORIDE: .6; .31; .03; .02 INJECTION, SOLUTION INTRAVENOUS at 12:51

## 2025-07-21 RX ADMIN — ENOXAPARIN SODIUM 30 MG: 100 INJECTION SUBCUTANEOUS at 08:38

## 2025-07-21 RX ADMIN — ENOXAPARIN SODIUM 30 MG: 100 INJECTION SUBCUTANEOUS at 20:19

## 2025-07-21 ASSESSMENT — PAIN SCALES - GENERAL
PAINLEVEL_OUTOF10: 2
PAINLEVEL_OUTOF10: 6
PAINLEVEL_OUTOF10: 6
PAINLEVEL_OUTOF10: 4
PAINLEVEL_OUTOF10: 6
PAINLEVEL_OUTOF10: 0

## 2025-07-21 ASSESSMENT — ENCOUNTER SYMPTOMS
ABDOMINAL PAIN: 1
ABDOMINAL DISTENTION: 1
SHORTNESS OF BREATH: 0
VOMITING: 0
BACK PAIN: 0
NAUSEA: 0
COUGH: 0
CONSTIPATION: 1
WHEEZING: 0

## 2025-07-21 ASSESSMENT — PAIN DESCRIPTION - DESCRIPTORS
DESCRIPTORS: ACHING;CRAMPING
DESCRIPTORS: SORE
DESCRIPTORS: DISCOMFORT;SORE

## 2025-07-21 ASSESSMENT — PAIN SCALES - WONG BAKER: WONGBAKER_NUMERICALRESPONSE: NO HURT

## 2025-07-21 ASSESSMENT — PAIN - FUNCTIONAL ASSESSMENT
PAIN_FUNCTIONAL_ASSESSMENT: ACTIVITIES ARE NOT PREVENTED
PAIN_FUNCTIONAL_ASSESSMENT: ACTIVITIES ARE NOT PREVENTED

## 2025-07-21 ASSESSMENT — PAIN DESCRIPTION - LOCATION
LOCATION: ABDOMEN

## 2025-07-21 NOTE — PLAN OF CARE
Problem: Pain  Goal: Verbalizes/displays adequate comfort level or baseline comfort level  7/21/2025 0113 by Chloé Lopez, RN  Outcome: Progressing  Flowsheets (Taken 7/21/2025 0113)  Verbalizes/displays adequate comfort level or baseline comfort level:   Encourage patient to monitor pain and request assistance   Assess pain using appropriate pain scale   Administer analgesics based on type and severity of pain and evaluate response  7/20/2025 1210 by Eun Wakefield, RN  Outcome: Progressing     Problem: Safety - Adult  Goal: Free from fall injury  Outcome: Progressing  Flowsheets (Taken 7/21/2025 0113)  Free From Fall Injury:   Instruct family/caregiver on patient safety   Based on caregiver fall risk screen, instruct family/caregiver to ask for assistance with transferring infant if caregiver noted to have fall risk factors

## 2025-07-21 NOTE — PLAN OF CARE
Problem: Nutrition Deficit:  Goal: Optimize nutritional status  Flowsheets (Taken 7/21/2025 6801)  Nutrient intake appropriate for improving, restoring, or maintaining nutritional needs:   Assess nutritional status and recommend course of action   Recommend appropriate diets, oral nutritional supplements, and vitamin/mineral supplements   Monitor oral intake, labs, and treatment plans

## 2025-07-21 NOTE — PLAN OF CARE
Problem: Discharge Planning  Goal: Discharge to home or other facility with appropriate resources  7/21/2025 1403 by Albina Quiroz, RN  Outcome: Progressing     Problem: Pain  Goal: Verbalizes/displays adequate comfort level or baseline comfort level  7/21/2025 1403 by Albina Quiroz, RN  Outcome: Progressing     Problem: Safety - Adult  Goal: Free from fall injury  7/21/2025 1403 by Albina Quiroz, RN  Outcome: Progressing

## 2025-07-22 LAB
ALBUMIN SERPL-MCNC: 2.9 G/DL (ref 3.5–5)
ALBUMIN/GLOB SERPL: 0.9 (ref 1.1–2.2)
ALP SERPL-CCNC: 64 U/L (ref 45–117)
ALT SERPL-CCNC: 25 U/L (ref 12–78)
ANION GAP SERPL CALC-SCNC: 12 MMOL/L (ref 2–12)
AST SERPL W P-5'-P-CCNC: 24 U/L (ref 15–37)
BACTERIA SPEC CULT: NORMAL
BASOPHILS # BLD: 0.02 K/UL (ref 0–0.1)
BASOPHILS NFR BLD: 0.2 % (ref 0–1)
BILIRUB SERPL-MCNC: 1.6 MG/DL (ref 0.2–1)
BUN SERPL-MCNC: 6 MG/DL (ref 6–20)
BUN/CREAT SERPL: 12 (ref 12–20)
CA-I BLD-MCNC: 8.7 MG/DL (ref 8.5–10.1)
CHLORIDE SERPL-SCNC: 110 MMOL/L (ref 97–108)
CO2 SERPL-SCNC: 21 MMOL/L (ref 21–32)
CREAT SERPL-MCNC: 0.5 MG/DL (ref 0.55–1.02)
DIFFERENTIAL METHOD BLD: ABNORMAL
EOSINOPHIL # BLD: 0.1 K/UL (ref 0–0.4)
EOSINOPHIL NFR BLD: 1.1 % (ref 0–7)
ERYTHROCYTE [DISTWIDTH] IN BLOOD BY AUTOMATED COUNT: 13.1 % (ref 11.5–14.5)
GLOBULIN SER CALC-MCNC: 3.3 G/DL (ref 2–4)
GLUCOSE SERPL-MCNC: 98 MG/DL (ref 65–100)
HCT VFR BLD AUTO: 36.4 % (ref 35–47)
HGB BLD-MCNC: 12.1 G/DL (ref 11.5–16)
IMM GRANULOCYTES # BLD AUTO: 0.1 K/UL (ref 0–0.04)
IMM GRANULOCYTES NFR BLD AUTO: 1.1 % (ref 0–0.5)
LYMPHOCYTES # BLD: 1.23 K/UL (ref 0.8–3.5)
LYMPHOCYTES NFR BLD: 13.3 % (ref 12–49)
Lab: NORMAL
MCH RBC QN AUTO: 28.2 PG (ref 26–34)
MCHC RBC AUTO-ENTMCNC: 33.2 G/DL (ref 30–36.5)
MCV RBC AUTO: 84.8 FL (ref 80–99)
MONOCYTES # BLD: 0.98 K/UL (ref 0–1)
MONOCYTES NFR BLD: 10.6 % (ref 5–13)
NEUTS SEG # BLD: 6.84 K/UL (ref 1.8–8)
NEUTS SEG NFR BLD: 73.7 % (ref 32–75)
NRBC # BLD: 0 K/UL (ref 0–0.01)
NRBC BLD-RTO: 0 PER 100 WBC
PLATELET # BLD AUTO: 284 K/UL (ref 150–400)
PMV BLD AUTO: 10.8 FL (ref 8.9–12.9)
POTASSIUM SERPL-SCNC: 3.1 MMOL/L (ref 3.5–5.1)
POTASSIUM SERPL-SCNC: 3.3 MMOL/L (ref 3.5–5.1)
PROT SERPL-MCNC: 6.2 G/DL (ref 6.4–8.2)
RBC # BLD AUTO: 4.29 M/UL (ref 3.8–5.2)
SODIUM SERPL-SCNC: 143 MMOL/L (ref 136–145)
WBC # BLD AUTO: 9.3 K/UL (ref 3.6–11)

## 2025-07-22 PROCEDURE — 97530 THERAPEUTIC ACTIVITIES: CPT

## 2025-07-22 PROCEDURE — 6360000002 HC RX W HCPCS: Performed by: STUDENT IN AN ORGANIZED HEALTH CARE EDUCATION/TRAINING PROGRAM

## 2025-07-22 PROCEDURE — 2500000003 HC RX 250 WO HCPCS: Performed by: INTERNAL MEDICINE

## 2025-07-22 PROCEDURE — 36415 COLL VENOUS BLD VENIPUNCTURE: CPT

## 2025-07-22 PROCEDURE — 97165 OT EVAL LOW COMPLEX 30 MIN: CPT

## 2025-07-22 PROCEDURE — 6360000002 HC RX W HCPCS: Performed by: INTERNAL MEDICINE

## 2025-07-22 PROCEDURE — 1100000000 HC RM PRIVATE

## 2025-07-22 PROCEDURE — 6360000002 HC RX W HCPCS

## 2025-07-22 PROCEDURE — 85025 COMPLETE CBC W/AUTO DIFF WBC: CPT

## 2025-07-22 PROCEDURE — 84132 ASSAY OF SERUM POTASSIUM: CPT

## 2025-07-22 PROCEDURE — 6360000002 HC RX W HCPCS: Performed by: NURSE PRACTITIONER

## 2025-07-22 PROCEDURE — 6370000000 HC RX 637 (ALT 250 FOR IP): Performed by: INTERNAL MEDICINE

## 2025-07-22 PROCEDURE — 2500000003 HC RX 250 WO HCPCS: Performed by: STUDENT IN AN ORGANIZED HEALTH CARE EDUCATION/TRAINING PROGRAM

## 2025-07-22 PROCEDURE — 99024 POSTOP FOLLOW-UP VISIT: CPT | Performed by: STUDENT IN AN ORGANIZED HEALTH CARE EDUCATION/TRAINING PROGRAM

## 2025-07-22 PROCEDURE — 80053 COMPREHEN METABOLIC PANEL: CPT

## 2025-07-22 RX ORDER — SODIUM CHLORIDE AND POTASSIUM CHLORIDE 150; 450 MG/100ML; MG/100ML
INJECTION, SOLUTION INTRAVENOUS CONTINUOUS
Status: DISCONTINUED | OUTPATIENT
Start: 2025-07-22 | End: 2025-07-23

## 2025-07-22 RX ORDER — POTASSIUM CHLORIDE 7.45 MG/ML
10 INJECTION INTRAVENOUS
Status: DISPENSED | OUTPATIENT
Start: 2025-07-22 | End: 2025-07-22

## 2025-07-22 RX ADMIN — POTASSIUM CHLORIDE 10 MEQ: 7.46 INJECTION, SOLUTION INTRAVENOUS at 13:18

## 2025-07-22 RX ADMIN — SODIUM CHLORIDE, PRESERVATIVE FREE 10 ML: 5 INJECTION INTRAVENOUS at 23:38

## 2025-07-22 RX ADMIN — PROCHLORPERAZINE EDISYLATE 10 MG: 5 INJECTION INTRAMUSCULAR; INTRAVENOUS at 21:26

## 2025-07-22 RX ADMIN — POTASSIUM CHLORIDE 10 MEQ: 7.46 INJECTION, SOLUTION INTRAVENOUS at 08:54

## 2025-07-22 RX ADMIN — HYDROMORPHONE HYDROCHLORIDE 0.25 MG: 1 INJECTION, SOLUTION INTRAMUSCULAR; INTRAVENOUS; SUBCUTANEOUS at 08:22

## 2025-07-22 RX ADMIN — HYDROMORPHONE HYDROCHLORIDE 0.25 MG: 1 INJECTION, SOLUTION INTRAMUSCULAR; INTRAVENOUS; SUBCUTANEOUS at 21:26

## 2025-07-22 RX ADMIN — POTASSIUM CHLORIDE AND SODIUM CHLORIDE: 450; 150 INJECTION, SOLUTION INTRAVENOUS at 11:11

## 2025-07-22 RX ADMIN — ACETAMINOPHEN 650 MG: 325 TABLET ORAL at 04:13

## 2025-07-22 RX ADMIN — PROCHLORPERAZINE EDISYLATE 10 MG: 5 INJECTION INTRAMUSCULAR; INTRAVENOUS at 08:23

## 2025-07-22 RX ADMIN — POTASSIUM CHLORIDE 10 MEQ: 7.46 INJECTION, SOLUTION INTRAVENOUS at 15:11

## 2025-07-22 RX ADMIN — ACETAMINOPHEN 650 MG: 325 TABLET ORAL at 21:27

## 2025-07-22 RX ADMIN — ENOXAPARIN SODIUM 30 MG: 100 INJECTION SUBCUTANEOUS at 08:23

## 2025-07-22 RX ADMIN — POTASSIUM CHLORIDE 10 MEQ: 7.46 INJECTION, SOLUTION INTRAVENOUS at 10:57

## 2025-07-22 RX ADMIN — ENOXAPARIN SODIUM 30 MG: 100 INJECTION SUBCUTANEOUS at 21:37

## 2025-07-22 RX ADMIN — PANTOPRAZOLE SODIUM 40 MG: 40 INJECTION, POWDER, FOR SOLUTION INTRAVENOUS at 21:32

## 2025-07-22 RX ADMIN — SODIUM CHLORIDE, PRESERVATIVE FREE 10 ML: 5 INJECTION INTRAVENOUS at 08:23

## 2025-07-22 RX ADMIN — PANTOPRAZOLE SODIUM 40 MG: 40 INJECTION, POWDER, FOR SOLUTION INTRAVENOUS at 08:23

## 2025-07-22 RX ADMIN — CEFTRIAXONE SODIUM 1000 MG: 1 INJECTION, POWDER, FOR SOLUTION INTRAMUSCULAR; INTRAVENOUS at 10:57

## 2025-07-22 ASSESSMENT — ENCOUNTER SYMPTOMS
SHORTNESS OF BREATH: 0
VOMITING: 0
ABDOMINAL DISTENTION: 1
NAUSEA: 0
WHEEZING: 0
CONSTIPATION: 1
BACK PAIN: 0
ABDOMINAL PAIN: 1

## 2025-07-22 ASSESSMENT — PAIN SCALES - GENERAL
PAINLEVEL_OUTOF10: 6
PAINLEVEL_OUTOF10: 5
PAINLEVEL_OUTOF10: 4
PAINLEVEL_OUTOF10: 5

## 2025-07-22 ASSESSMENT — PAIN DESCRIPTION - LOCATION
LOCATION: ABDOMEN
LOCATION: ABDOMEN

## 2025-07-22 ASSESSMENT — PAIN DESCRIPTION - DESCRIPTORS: DESCRIPTORS: CRAMPING;SPASM;SQUEEZING

## 2025-07-22 ASSESSMENT — PAIN SCALES - WONG BAKER: WONGBAKER_NUMERICALRESPONSE: NO HURT

## 2025-07-22 NOTE — PLAN OF CARE
Problem: Discharge Planning  Goal: Discharge to home or other facility with appropriate resources  7/21/2025 2032 by Shell Calhoun, RN  Outcome: Progressing  7/21/2025 1403 by Albina Quiroz, RN  Outcome: Progressing

## 2025-07-23 LAB
ALBUMIN SERPL-MCNC: 2.9 G/DL (ref 3.5–5)
ALBUMIN/GLOB SERPL: 0.9 (ref 1.1–2.2)
ALP SERPL-CCNC: 71 U/L (ref 45–117)
ALT SERPL-CCNC: 26 U/L (ref 12–78)
ANION GAP SERPL CALC-SCNC: 10 MMOL/L (ref 2–12)
AST SERPL W P-5'-P-CCNC: 20 U/L (ref 15–37)
BASOPHILS # BLD: 0.03 K/UL (ref 0–0.1)
BASOPHILS NFR BLD: 0.4 % (ref 0–1)
BILIRUB SERPL-MCNC: 0.9 MG/DL (ref 0.2–1)
BUN SERPL-MCNC: 5 MG/DL (ref 6–20)
BUN/CREAT SERPL: 11 (ref 12–20)
CA-I BLD-MCNC: 9.2 MG/DL (ref 8.5–10.1)
CHLORIDE SERPL-SCNC: 106 MMOL/L (ref 97–108)
CO2 SERPL-SCNC: 24 MMOL/L (ref 21–32)
CREAT SERPL-MCNC: 0.47 MG/DL (ref 0.55–1.02)
DIFFERENTIAL METHOD BLD: ABNORMAL
EOSINOPHIL # BLD: 0.22 K/UL (ref 0–0.4)
EOSINOPHIL NFR BLD: 2.8 % (ref 0–7)
ERYTHROCYTE [DISTWIDTH] IN BLOOD BY AUTOMATED COUNT: 13.2 % (ref 11.5–14.5)
GLOBULIN SER CALC-MCNC: 3.3 G/DL (ref 2–4)
GLUCOSE SERPL-MCNC: 91 MG/DL (ref 65–100)
HCT VFR BLD AUTO: 37.4 % (ref 35–47)
HGB BLD-MCNC: 12.2 G/DL (ref 11.5–16)
IMM GRANULOCYTES # BLD AUTO: 0.08 K/UL (ref 0–0.04)
IMM GRANULOCYTES NFR BLD AUTO: 1 % (ref 0–0.5)
LYMPHOCYTES # BLD: 1.69 K/UL (ref 0.8–3.5)
LYMPHOCYTES NFR BLD: 21.2 % (ref 12–49)
MAGNESIUM SERPL-MCNC: 2.1 MG/DL (ref 1.6–2.4)
MCH RBC QN AUTO: 28.9 PG (ref 26–34)
MCHC RBC AUTO-ENTMCNC: 32.6 G/DL (ref 30–36.5)
MCV RBC AUTO: 88.6 FL (ref 80–99)
MONOCYTES # BLD: 0.75 K/UL (ref 0–1)
MONOCYTES NFR BLD: 9.4 % (ref 5–13)
NEUTS SEG # BLD: 5.2 K/UL (ref 1.8–8)
NEUTS SEG NFR BLD: 65.2 % (ref 32–75)
NRBC # BLD: 0 K/UL (ref 0–0.01)
NRBC BLD-RTO: 0 PER 100 WBC
PHOSPHATE SERPL-MCNC: 2.7 MG/DL (ref 2.6–4.7)
PLATELET # BLD AUTO: 294 K/UL (ref 150–400)
PMV BLD AUTO: 10.7 FL (ref 8.9–12.9)
POTASSIUM SERPL-SCNC: 3.3 MMOL/L (ref 3.5–5.1)
PROT SERPL-MCNC: 6.2 G/DL (ref 6.4–8.2)
RBC # BLD AUTO: 4.22 M/UL (ref 3.8–5.2)
SODIUM SERPL-SCNC: 140 MMOL/L (ref 136–145)
WBC # BLD AUTO: 8 K/UL (ref 3.6–11)

## 2025-07-23 PROCEDURE — 94761 N-INVAS EAR/PLS OXIMETRY MLT: CPT

## 2025-07-23 PROCEDURE — 36415 COLL VENOUS BLD VENIPUNCTURE: CPT

## 2025-07-23 PROCEDURE — 84100 ASSAY OF PHOSPHORUS: CPT

## 2025-07-23 PROCEDURE — 1100000000 HC RM PRIVATE

## 2025-07-23 PROCEDURE — 85025 COMPLETE CBC W/AUTO DIFF WBC: CPT

## 2025-07-23 PROCEDURE — 97161 PT EVAL LOW COMPLEX 20 MIN: CPT

## 2025-07-23 PROCEDURE — 6360000002 HC RX W HCPCS: Performed by: STUDENT IN AN ORGANIZED HEALTH CARE EDUCATION/TRAINING PROGRAM

## 2025-07-23 PROCEDURE — 6360000002 HC RX W HCPCS: Performed by: NURSE PRACTITIONER

## 2025-07-23 PROCEDURE — 6360000002 HC RX W HCPCS: Performed by: INTERNAL MEDICINE

## 2025-07-23 PROCEDURE — 6370000000 HC RX 637 (ALT 250 FOR IP): Performed by: INTERNAL MEDICINE

## 2025-07-23 PROCEDURE — 80053 COMPREHEN METABOLIC PANEL: CPT

## 2025-07-23 PROCEDURE — 83735 ASSAY OF MAGNESIUM: CPT

## 2025-07-23 PROCEDURE — 99024 POSTOP FOLLOW-UP VISIT: CPT | Performed by: STUDENT IN AN ORGANIZED HEALTH CARE EDUCATION/TRAINING PROGRAM

## 2025-07-23 PROCEDURE — 2500000003 HC RX 250 WO HCPCS: Performed by: STUDENT IN AN ORGANIZED HEALTH CARE EDUCATION/TRAINING PROGRAM

## 2025-07-23 PROCEDURE — 97116 GAIT TRAINING THERAPY: CPT

## 2025-07-23 PROCEDURE — 2500000003 HC RX 250 WO HCPCS: Performed by: INTERNAL MEDICINE

## 2025-07-23 PROCEDURE — 97530 THERAPEUTIC ACTIVITIES: CPT

## 2025-07-23 RX ORDER — POTASSIUM CHLORIDE 7.45 MG/ML
10 INJECTION INTRAVENOUS ONCE
Status: COMPLETED | OUTPATIENT
Start: 2025-07-23 | End: 2025-07-23

## 2025-07-23 RX ORDER — SODIUM CHLORIDE AND POTASSIUM CHLORIDE 150; 450 MG/100ML; MG/100ML
INJECTION, SOLUTION INTRAVENOUS CONTINUOUS
Status: DISCONTINUED | OUTPATIENT
Start: 2025-07-23 | End: 2025-07-24

## 2025-07-23 RX ORDER — POTASSIUM CHLORIDE 7.45 MG/ML
10 INJECTION INTRAVENOUS
Status: DISPENSED | OUTPATIENT
Start: 2025-07-23 | End: 2025-07-23

## 2025-07-23 RX ADMIN — POTASSIUM CHLORIDE AND SODIUM CHLORIDE: 450; 150 INJECTION, SOLUTION INTRAVENOUS at 18:27

## 2025-07-23 RX ADMIN — POTASSIUM BICARBONATE 40 MEQ: 782 TABLET, EFFERVESCENT ORAL at 04:16

## 2025-07-23 RX ADMIN — POTASSIUM CHLORIDE AND SODIUM CHLORIDE: 450; 150 INJECTION, SOLUTION INTRAVENOUS at 00:34

## 2025-07-23 RX ADMIN — HYDROMORPHONE HYDROCHLORIDE 0.25 MG: 1 INJECTION, SOLUTION INTRAMUSCULAR; INTRAVENOUS; SUBCUTANEOUS at 18:27

## 2025-07-23 RX ADMIN — POTASSIUM CHLORIDE 10 MEQ: 7.46 INJECTION, SOLUTION INTRAVENOUS at 09:18

## 2025-07-23 RX ADMIN — PANTOPRAZOLE SODIUM 40 MG: 40 INJECTION, POWDER, FOR SOLUTION INTRAVENOUS at 09:17

## 2025-07-23 RX ADMIN — ENOXAPARIN SODIUM 30 MG: 100 INJECTION SUBCUTANEOUS at 22:43

## 2025-07-23 RX ADMIN — SODIUM CHLORIDE, PRESERVATIVE FREE 10 ML: 5 INJECTION INTRAVENOUS at 23:05

## 2025-07-23 RX ADMIN — PANTOPRAZOLE SODIUM 40 MG: 40 INJECTION, POWDER, FOR SOLUTION INTRAVENOUS at 22:45

## 2025-07-23 RX ADMIN — CEFTRIAXONE SODIUM 1000 MG: 1 INJECTION, POWDER, FOR SOLUTION INTRAMUSCULAR; INTRAVENOUS at 09:17

## 2025-07-23 RX ADMIN — POTASSIUM CHLORIDE 10 MEQ: 7.46 INJECTION, SOLUTION INTRAVENOUS at 11:08

## 2025-07-23 RX ADMIN — HYDROMORPHONE HYDROCHLORIDE 0.5 MG: 1 INJECTION, SOLUTION INTRAMUSCULAR; INTRAVENOUS; SUBCUTANEOUS at 22:54

## 2025-07-23 RX ADMIN — ONDANSETRON 4 MG: 2 INJECTION INTRAMUSCULAR; INTRAVENOUS at 23:00

## 2025-07-23 RX ADMIN — SODIUM CHLORIDE, PRESERVATIVE FREE 10 ML: 5 INJECTION INTRAVENOUS at 09:17

## 2025-07-23 RX ADMIN — POTASSIUM CHLORIDE AND SODIUM CHLORIDE: 450; 150 INJECTION, SOLUTION INTRAVENOUS at 08:10

## 2025-07-23 RX ADMIN — ENOXAPARIN SODIUM 30 MG: 100 INJECTION SUBCUTANEOUS at 09:18

## 2025-07-23 RX ADMIN — ONDANSETRON 4 MG: 2 INJECTION INTRAMUSCULAR; INTRAVENOUS at 18:30

## 2025-07-23 ASSESSMENT — PAIN SCALES - GENERAL
PAINLEVEL_OUTOF10: 7
PAINLEVEL_OUTOF10: 0
PAINLEVEL_OUTOF10: 0
PAINLEVEL_OUTOF10: 4
PAINLEVEL_OUTOF10: 7

## 2025-07-23 ASSESSMENT — PAIN DESCRIPTION - LOCATION
LOCATION: ABDOMEN
LOCATION: ABDOMEN

## 2025-07-23 ASSESSMENT — ENCOUNTER SYMPTOMS
SHORTNESS OF BREATH: 0
BACK PAIN: 0
WHEEZING: 0
NAUSEA: 0
ABDOMINAL PAIN: 1
ABDOMINAL DISTENTION: 1
COUGH: 0
VOMITING: 0
CONSTIPATION: 1

## 2025-07-23 ASSESSMENT — PAIN DESCRIPTION - DESCRIPTORS: DESCRIPTORS: DISCOMFORT

## 2025-07-23 NOTE — CARE COORDINATION
CM met with patient to discuss DCP and HH, patient stated she was not sure if she would need HH on d/c and asked CM to follow up closer to dc to discuss again.    CM continues to follow and monitor for needs.

## 2025-07-23 NOTE — PLAN OF CARE
PHYSICAL THERAPY EVALUATION  Patient: Liz Simms (49 y.o. female)  Date: 7/23/2025  Primary Diagnosis: Abdominal pain [R10.9]  Procedure(s) (LRB):  LAPAROTOMY EXPLORATORY WITH LYSIS OF ADHESIONS (N/A) 3 Days Post-Op   Precautions: NPO                      Recommendations for nursing mobility: Out of bed to chair for meals, Frequent repositioning to prevent skin breakdown, AD and gt belt for bed to chair , Amb to bathroom with AD and gait belt, Assist x1, and abdominal precautions    In place during session: Peripheral IV and Nasogastric Tube    ASSESSMENT  Pt is a 49 y.o. female admitted on 7/17/2025 for sudden onset abdominal pain, N/V that began 1 day PTA; pt currently being treated for SBO, ex-lap with lysis of adhesions, hypokalemia, obesity, HTN, UTI . Pt lying in semisupine position upon PT arrival, agreeable to evaluation. Pt A&O x 4.  Patient has abdominal binder in place. Laparotomy exploratory with lysis of adhesions 7/20/2025    Based on the objective data described below, the patient currently presents with impaired functional mobility, poor body mechanics, decreased activity tolerance, and increased pain levels. (See below for objective details and assist levels).     Overall pt tolerated session well today with PT evaluation.  Patient educated on logrolling for bed mobility and the benefits of performing logrolling. Pt required sba for bed mobility with logrolling. Patient transfer sit>stand with cga. Pt amb ~150 feet with rw, gt belt and close sba; demonstrates very slow latisha with guarded posture. Patient rates pain in superior abdominal incisiont 6.5-7/10 with ambulation. Patient perform toilet transfer with sba using rail. Patient transfer stand>sit with cga/sba for safety.  Patient performs all activity with increased time and guarded posture. Patient left up in chair with all needs met. Pt will benefit from continued skilled PT to address above deficits and return to PLOF.

## 2025-07-23 NOTE — PLAN OF CARE
Problem: Discharge Planning  Goal: Discharge to home or other facility with appropriate resources  7/23/2025 1105 by Eun Wakefield RN  Outcome: Progressing  7/22/2025 2318 by Yesenia Fontaine RN  Flowsheets (Taken 7/21/2025 0113 by Chloé Lopez, RN)  Discharge to home or other facility with appropriate resources:   Identify barriers to discharge with patient and caregiver   Arrange for needed discharge resources and transportation as appropriate   Identify discharge learning needs (meds, wound care, etc)     Problem: Pain  Goal: Verbalizes/displays adequate comfort level or baseline comfort level  7/23/2025 1105 by Eun Wakefield RN  Outcome: Progressing  7/22/2025 2318 by Yesenia Fontaine RN  Flowsheets (Taken 7/21/2025 0113 by Chloé Lopez, RN)  Verbalizes/displays adequate comfort level or baseline comfort level:   Encourage patient to monitor pain and request assistance   Assess pain using appropriate pain scale   Administer analgesics based on type and severity of pain and evaluate response

## 2025-07-24 LAB
ALBUMIN SERPL-MCNC: 3.1 G/DL (ref 3.5–5)
ALBUMIN/GLOB SERPL: 0.9 (ref 1.1–2.2)
ALP SERPL-CCNC: 70 U/L (ref 45–117)
ALT SERPL-CCNC: 30 U/L (ref 12–78)
ANION GAP SERPL CALC-SCNC: 9 MMOL/L (ref 2–12)
AST SERPL W P-5'-P-CCNC: 20 U/L (ref 15–37)
BASOPHILS # BLD: 0.03 K/UL (ref 0–0.1)
BASOPHILS NFR BLD: 0.5 % (ref 0–1)
BILIRUB SERPL-MCNC: 0.7 MG/DL (ref 0.2–1)
BUN SERPL-MCNC: 5 MG/DL (ref 6–20)
BUN/CREAT SERPL: 10 (ref 12–20)
CA-I BLD-MCNC: 9.5 MG/DL (ref 8.5–10.1)
CHLORIDE SERPL-SCNC: 108 MMOL/L (ref 97–108)
CO2 SERPL-SCNC: 23 MMOL/L (ref 21–32)
CREAT SERPL-MCNC: 0.5 MG/DL (ref 0.55–1.02)
DIFFERENTIAL METHOD BLD: ABNORMAL
EOSINOPHIL # BLD: 0.23 K/UL (ref 0–0.4)
EOSINOPHIL NFR BLD: 3.6 % (ref 0–7)
ERYTHROCYTE [DISTWIDTH] IN BLOOD BY AUTOMATED COUNT: 13 % (ref 11.5–14.5)
GLOBULIN SER CALC-MCNC: 3.5 G/DL (ref 2–4)
GLUCOSE SERPL-MCNC: 112 MG/DL (ref 65–100)
HCT VFR BLD AUTO: 38.3 % (ref 35–47)
HGB BLD-MCNC: 12.2 G/DL (ref 11.5–16)
IMM GRANULOCYTES # BLD AUTO: 0.08 K/UL (ref 0–0.04)
IMM GRANULOCYTES NFR BLD AUTO: 1.2 % (ref 0–0.5)
LYMPHOCYTES # BLD: 1.45 K/UL (ref 0.8–3.5)
LYMPHOCYTES NFR BLD: 22.5 % (ref 12–49)
MCH RBC QN AUTO: 27.7 PG (ref 26–34)
MCHC RBC AUTO-ENTMCNC: 31.9 G/DL (ref 30–36.5)
MCV RBC AUTO: 87 FL (ref 80–99)
MONOCYTES # BLD: 0.6 K/UL (ref 0–1)
MONOCYTES NFR BLD: 9.3 % (ref 5–13)
NEUTS SEG # BLD: 4.05 K/UL (ref 1.8–8)
NEUTS SEG NFR BLD: 62.9 % (ref 32–75)
NRBC # BLD: 0 K/UL (ref 0–0.01)
NRBC BLD-RTO: 0 PER 100 WBC
PLATELET # BLD AUTO: 324 K/UL (ref 150–400)
PMV BLD AUTO: 10.8 FL (ref 8.9–12.9)
POTASSIUM SERPL-SCNC: 3.4 MMOL/L (ref 3.5–5.1)
PROT SERPL-MCNC: 6.6 G/DL (ref 6.4–8.2)
RBC # BLD AUTO: 4.4 M/UL (ref 3.8–5.2)
SODIUM SERPL-SCNC: 140 MMOL/L (ref 136–145)
WBC # BLD AUTO: 6.4 K/UL (ref 3.6–11)

## 2025-07-24 PROCEDURE — 6360000002 HC RX W HCPCS: Performed by: INTERNAL MEDICINE

## 2025-07-24 PROCEDURE — 85025 COMPLETE CBC W/AUTO DIFF WBC: CPT

## 2025-07-24 PROCEDURE — 6370000000 HC RX 637 (ALT 250 FOR IP): Performed by: STUDENT IN AN ORGANIZED HEALTH CARE EDUCATION/TRAINING PROGRAM

## 2025-07-24 PROCEDURE — 80053 COMPREHEN METABOLIC PANEL: CPT

## 2025-07-24 PROCEDURE — 2500000003 HC RX 250 WO HCPCS: Performed by: INTERNAL MEDICINE

## 2025-07-24 PROCEDURE — 36415 COLL VENOUS BLD VENIPUNCTURE: CPT

## 2025-07-24 PROCEDURE — 6370000000 HC RX 637 (ALT 250 FOR IP): Performed by: INTERNAL MEDICINE

## 2025-07-24 PROCEDURE — 99024 POSTOP FOLLOW-UP VISIT: CPT | Performed by: STUDENT IN AN ORGANIZED HEALTH CARE EDUCATION/TRAINING PROGRAM

## 2025-07-24 PROCEDURE — 6360000002 HC RX W HCPCS: Performed by: STUDENT IN AN ORGANIZED HEALTH CARE EDUCATION/TRAINING PROGRAM

## 2025-07-24 PROCEDURE — 1100000000 HC RM PRIVATE

## 2025-07-24 RX ORDER — PANTOPRAZOLE SODIUM 40 MG/1
40 TABLET, DELAYED RELEASE ORAL
Status: DISCONTINUED | OUTPATIENT
Start: 2025-07-25 | End: 2025-07-25 | Stop reason: HOSPADM

## 2025-07-24 RX ADMIN — ENOXAPARIN SODIUM 30 MG: 100 INJECTION SUBCUTANEOUS at 10:03

## 2025-07-24 RX ADMIN — ACETAMINOPHEN 650 MG: 325 TABLET ORAL at 17:42

## 2025-07-24 RX ADMIN — SODIUM CHLORIDE, PRESERVATIVE FREE 10 ML: 5 INJECTION INTRAVENOUS at 10:05

## 2025-07-24 RX ADMIN — ENOXAPARIN SODIUM 30 MG: 100 INJECTION SUBCUTANEOUS at 23:04

## 2025-07-24 RX ADMIN — ACETAMINOPHEN 650 MG: 325 TABLET ORAL at 10:16

## 2025-07-24 RX ADMIN — POTASSIUM BICARBONATE 40 MEQ: 782 TABLET, EFFERVESCENT ORAL at 10:03

## 2025-07-24 RX ADMIN — ACETAMINOPHEN 650 MG: 325 TABLET ORAL at 23:10

## 2025-07-24 RX ADMIN — SODIUM CHLORIDE, PRESERVATIVE FREE 10 ML: 5 INJECTION INTRAVENOUS at 23:04

## 2025-07-24 RX ADMIN — POTASSIUM CHLORIDE AND SODIUM CHLORIDE: 450; 150 INJECTION, SOLUTION INTRAVENOUS at 04:24

## 2025-07-24 ASSESSMENT — PAIN SCALES - GENERAL
PAINLEVEL_OUTOF10: 6
PAINLEVEL_OUTOF10: 3
PAINLEVEL_OUTOF10: 6
PAINLEVEL_OUTOF10: 4
PAINLEVEL_OUTOF10: 3

## 2025-07-24 ASSESSMENT — ENCOUNTER SYMPTOMS
VOMITING: 0
COUGH: 0
NAUSEA: 0
ABDOMINAL DISTENTION: 1
ABDOMINAL PAIN: 1
SHORTNESS OF BREATH: 0
BACK PAIN: 0
CONSTIPATION: 1

## 2025-07-24 ASSESSMENT — PAIN DESCRIPTION - ORIENTATION: ORIENTATION: MID

## 2025-07-24 ASSESSMENT — PAIN DESCRIPTION - LOCATION
LOCATION: ABDOMEN

## 2025-07-24 ASSESSMENT — PAIN DESCRIPTION - DESCRIPTORS: DESCRIPTORS: ACHING

## 2025-07-24 NOTE — PLAN OF CARE
Problem: Discharge Planning  Goal: Discharge to home or other facility with appropriate resources  Outcome: Progressing     Problem: Pain  Goal: Verbalizes/displays adequate comfort level or baseline comfort level  7/24/2025 1538 by Eun Wakefield, RN  Outcome: Progressing  7/24/2025 0410 by Gurpreet Antonio, RN  Outcome: Progressing

## 2025-07-24 NOTE — CARE COORDINATION
Chart reviewed, DCP plan remains for patient to return home with local family, CM to follow up about possible HH closer to d/c per patient's request.    CM continues to follow and monitor for needs.

## 2025-07-25 VITALS
HEART RATE: 91 BPM | DIASTOLIC BLOOD PRESSURE: 93 MMHG | SYSTOLIC BLOOD PRESSURE: 142 MMHG | HEIGHT: 69 IN | WEIGHT: 275 LBS | TEMPERATURE: 97.9 F | OXYGEN SATURATION: 97 % | BODY MASS INDEX: 40.73 KG/M2 | RESPIRATION RATE: 18 BRPM

## 2025-07-25 LAB
ALBUMIN SERPL-MCNC: 3 G/DL (ref 3.5–5)
ALBUMIN/GLOB SERPL: 0.9 (ref 1.1–2.2)
ALP SERPL-CCNC: 82 U/L (ref 45–117)
ALT SERPL-CCNC: 43 U/L (ref 12–78)
ANION GAP SERPL CALC-SCNC: 7 MMOL/L (ref 2–12)
AST SERPL W P-5'-P-CCNC: 35 U/L (ref 15–37)
BASOPHILS # BLD: 0.03 K/UL (ref 0–0.1)
BASOPHILS NFR BLD: 0.5 % (ref 0–1)
BILIRUB SERPL-MCNC: 0.4 MG/DL (ref 0.2–1)
BUN SERPL-MCNC: 5 MG/DL (ref 6–20)
BUN/CREAT SERPL: 8 (ref 12–20)
CA-I BLD-MCNC: 9.5 MG/DL (ref 8.5–10.1)
CHLORIDE SERPL-SCNC: 109 MMOL/L (ref 97–108)
CO2 SERPL-SCNC: 25 MMOL/L (ref 21–32)
CREAT SERPL-MCNC: 0.62 MG/DL (ref 0.55–1.02)
DIFFERENTIAL METHOD BLD: ABNORMAL
EOSINOPHIL # BLD: 0.24 K/UL (ref 0–0.4)
EOSINOPHIL NFR BLD: 3.8 % (ref 0–7)
ERYTHROCYTE [DISTWIDTH] IN BLOOD BY AUTOMATED COUNT: 13.2 % (ref 11.5–14.5)
GLOBULIN SER CALC-MCNC: 3.4 G/DL (ref 2–4)
GLUCOSE SERPL-MCNC: 111 MG/DL (ref 65–100)
HCT VFR BLD AUTO: 35.5 % (ref 35–47)
HGB BLD-MCNC: 11.6 G/DL (ref 11.5–16)
IMM GRANULOCYTES # BLD AUTO: 0.07 K/UL (ref 0–0.04)
IMM GRANULOCYTES NFR BLD AUTO: 1.1 % (ref 0–0.5)
LYMPHOCYTES # BLD: 1.82 K/UL (ref 0.8–3.5)
LYMPHOCYTES NFR BLD: 28.4 % (ref 12–49)
MCH RBC QN AUTO: 28.9 PG (ref 26–34)
MCHC RBC AUTO-ENTMCNC: 32.7 G/DL (ref 30–36.5)
MCV RBC AUTO: 88.3 FL (ref 80–99)
MONOCYTES # BLD: 0.6 K/UL (ref 0–1)
MONOCYTES NFR BLD: 9.4 % (ref 5–13)
NEUTS SEG # BLD: 3.64 K/UL (ref 1.8–8)
NEUTS SEG NFR BLD: 56.8 % (ref 32–75)
NRBC # BLD: 0 K/UL (ref 0–0.01)
NRBC BLD-RTO: 0 PER 100 WBC
PLATELET # BLD AUTO: 299 K/UL (ref 150–400)
PMV BLD AUTO: 11.7 FL (ref 8.9–12.9)
POTASSIUM SERPL-SCNC: 3.6 MMOL/L (ref 3.5–5.1)
PROT SERPL-MCNC: 6.4 G/DL (ref 6.4–8.2)
RBC # BLD AUTO: 4.02 M/UL (ref 3.8–5.2)
RBC MORPH BLD: ABNORMAL
SODIUM SERPL-SCNC: 141 MMOL/L (ref 136–145)
WBC # BLD AUTO: 6.4 K/UL (ref 3.6–11)

## 2025-07-25 PROCEDURE — 6370000000 HC RX 637 (ALT 250 FOR IP): Performed by: STUDENT IN AN ORGANIZED HEALTH CARE EDUCATION/TRAINING PROGRAM

## 2025-07-25 PROCEDURE — 36415 COLL VENOUS BLD VENIPUNCTURE: CPT

## 2025-07-25 PROCEDURE — 6370000000 HC RX 637 (ALT 250 FOR IP): Performed by: INTERNAL MEDICINE

## 2025-07-25 PROCEDURE — 80053 COMPREHEN METABOLIC PANEL: CPT

## 2025-07-25 PROCEDURE — 6360000002 HC RX W HCPCS: Performed by: INTERNAL MEDICINE

## 2025-07-25 PROCEDURE — 85025 COMPLETE CBC W/AUTO DIFF WBC: CPT

## 2025-07-25 RX ORDER — PANTOPRAZOLE SODIUM 40 MG/1
40 TABLET, DELAYED RELEASE ORAL
Qty: 30 TABLET | Refills: 3 | Status: SHIPPED | OUTPATIENT
Start: 2025-07-26

## 2025-07-25 RX ADMIN — ACETAMINOPHEN 650 MG: 325 TABLET ORAL at 09:32

## 2025-07-25 RX ADMIN — PANTOPRAZOLE SODIUM 40 MG: 40 TABLET, DELAYED RELEASE ORAL at 09:32

## 2025-07-25 RX ADMIN — ENOXAPARIN SODIUM 30 MG: 100 INJECTION SUBCUTANEOUS at 09:32

## 2025-07-25 RX ADMIN — POTASSIUM BICARBONATE 40 MEQ: 782 TABLET, EFFERVESCENT ORAL at 09:32

## 2025-07-25 ASSESSMENT — PAIN SCALES - GENERAL
PAINLEVEL_OUTOF10: 7
PAINLEVEL_OUTOF10: 7

## 2025-07-25 NOTE — DISCHARGE INSTRUCTIONS
Recommend begin slow increase in fiber foods after 4-6 weeks. Do not increase by greater than 5 g/day, maintain each increase for one week before increasing another 5 g/d. Call [phone] to speak with a dietitian regarding diet recommendations.   
Female

## 2025-07-25 NOTE — CARE COORDINATION
CM met with patient and followed up regarding HH, patient would like HH, no preference, referrals sent to 17 agencies, awaiting possible acceptance.    Patient will dc to [address] [place] [id number].

## 2025-07-25 NOTE — DISCHARGE SUMMARY
Hospitalist Discharge Summary     Patient ID:  Liz Simms  [id number]  49 y.o.  1975 7/17/2025    PCP on record: No, Pcp    Admit date: 7/17/2025  Discharge date and time: 7/25/2025    DISCHARGE DIAGNOSIS:    Small bowel obstruction  Obesity  Hypertension  Hypokalemia  Urinary tract infection    CONSULTATIONS:  IP CONSULT TO GENERAL SURGERY    Excerpted HPI from H&P of Mesfin Kong MD:  Chief Complaint: Abdominal pain, nausea, vomiting     Liz Simms is a 49 y.o. female with a benign pmh who presents with Abdominal pain  Symptoms of sudden and acute onset beginning today.  Patient has had prior emergency hysterectomy and says that some of her bowels had to be \"reconstructed\".  ER evaluation shows a CT scan with a small bowel obstruction with transition point possibly secondary to internal hernia.  Patient does take Wegovy a GLP-1 medication for weight loss.  She recently increased the dose.  Surgery has been consulted.  They have requested placement of NG tube.    ______________________________________________________________________  DISCHARGE SUMMARY/HOSPITAL COURSE:  for full details see H&P, daily progress notes, labs, consult notes.     Liz Simms is a 49 y.o. female with past medical history of menorrhagia who presented to the emergency department on 07/17/25 for evaluation of sudden onset abdominal pain, nausea, and vomiting that began 1 day prior to arrival. Prior surgical hx hysterectomy and says that some of her bowels had to be \"reconstructed.\" Patient reports that she recently upped her dose of Wegovy that she is taking for weight loss and thinks this is contributing to her symptoms.  States she has difficulty keeping liquids and solids down. Denies any fevers, chills, cough, SOB, chest pain, palpitations, or other associated complaints.   In the ED, hemodynamically stable.  Initial CBC and CMP unremarkable.  CT abd/pelvis showed distal

## 2025-07-25 NOTE — PROGRESS NOTES
Hospitalist Progress Note    NAME:   Liz Simms   : 1975   MRN: [id number]     Date/Time: 2025 2:07 PM  Patient PCP: Roxy, Pcp        Jeet huff   Patient presents with abdominal pain. Symptoms of sudden and acute onset beginning the day of admission.  Patient has had prior emergency hysterectomy and says that some of her bowels had to be \"reconstructed\".  ER evaluation shows a CT scan with a small bowel obstruction with transition point possibly secondary to internal hernia.  Patient does take Wegovy a GLP-1 medication for weight loss. Other symptoms nausea / emesis / obstipation  She recently increased the dose. Surgery has been consulted.      Assessment / Plan:  Abdominal pain   Nausea /Vomiting   GLP-1   -surgical team consulted  No evidence of bowel perforation, or ischemic bowel, or pneumatosis   -conservative measures for now   Maintain NGT per GS   NPO  -add PPI BID   -IV maintenance fluids for hydration  -trend lytes daily, replete prn keep K >4 , mag >2  Supportive therapy       Obesity   -wegovy on hold     Anemia   -trend cbc  unremarkable     Hypeertension   Hydralazine PRN for Sbp > 160       Medical Decision Making:   I personally reviewed labs:y  I personally reviewed imaging:y  I personally reviewed EKG:-  Toxic drug monitoring: lovenox   Discussed case with: pt        Code Status: full code   DVT Prophylaxis: scd/ lovenox   GI Prophylaxis: protonix     Subjective:     Chief Complaint / Reason for Physician Visit  Pt seen NGT still in place . States abd feels better . Discussed will defer mgmt of NGT to surgery team .  Objective:     VITALS:   Last 24hrs VS reviewed since prior progress note. Most recent are:  Patient Vitals for the past 24 hrs:   BP Temp Temp src Pulse Resp SpO2   25 1320 -- -- -- -- 18 --   25 1037 -- -- -- -- 18 --   25 1007 -- -- -- -- 18 --   25 0848 (!) 133/91 99 °F (37.2 °C) Oral 93 18 94 %   25 0438 133/85 99 
      Hospitalist Progress Note    NAME:   Liz Simms   : 1975   MRN: [id number]     Date/Time: 2025 7:18 AM  Patient PCP: Roxy, Pcp        Jeet huff   Patient presents with abdominal pain. Symptoms of sudden and acute onset beginning the day of admission.  Patient has had prior emergency hysterectomy and says that some of her bowels had to be \"reconstructed\".  ER evaluation shows a CT scan with a small bowel obstruction with transition point possibly secondary to internal hernia.  Patient does take Wegovy a GLP-1 medication for weight loss. Other symptoms nausea / emesis / obstipation  She recently increased the dose. Surgery has been consulted.      Assessment / Plan:  Abdominal pain   Nausea /Vomiting   GLP-1   -surgical team consulted  No evidence of bowel perforation, or ischemic bowel, or pneumatosis   -conservative measures for now   Maintain NGT per GS   NPO  -add PPI BID   -IV maintenance fluids for hydration  -trend lytes daily, replete prn keep K >4 , mag >2  Supportive therapy    abdominal x-ray on  showed unchanged diffuse gaseous small bowel distention compatible with small bowel obstruction   patient scheduled for exploratory laparotomy lysis of adhesions      Obesity   -wegovy on hold     Anemia   -trend cbc  unremarkable     Hypeertension   Hydralazine PRN for Sbp > 160       Medical Decision Making:   I personally reviewed labs:y  I personally reviewed imaging:y  I personally reviewed EKG:-  Toxic drug monitoring: lovenox   Discussed case with: pt        Code Status: full code   DVT Prophylaxis: scd/ lovenox   GI Prophylaxis: protonix     Subjective:     Chief Complaint / Reason for Physician Visit  Patient seen this morning as she is scheduled for surgery NG tube remains in place still has abdominal pain.    VITALS:   Last 24hrs VS reviewed since prior progress note. Most recent are:  Patient Vitals for the past 24 hrs:   BP Temp Temp src Pulse Resp SpO2 
      Hospitalist Progress Note    NAME:   Liz Simms   : 1975   MRN: [id number]     Date/Time: 2025 10:29 AM  Patient PCP: No, Pcp      Assessment / Plan:    Patient presents with abdominal pain. Symptoms of sudden and acute onset beginning the day of admission.  Patient has had prior emergency hysterectomy and says that some of her bowels had to be \"reconstructed\".  ER evaluation shows a CT scan with a small bowel obstruction with transition point possibly secondary to internal hernia.  Patient does take Wegovy a GLP-1 medication for weight loss. Other symptoms nausea / emesis / obstipation  She recently increased the dose. Surgery has been consulted.      -NPO  -surgical team consulted  No evidence of bowel perforation, or ischemic bowel, or pneumatosis   -conservative measures for now   -add PPI BID   -IV maintenance fluids for hydration  -trend lytes daily, replete prn keep K >4 , mag >2    Obesity   -wegovy on hold     Anemia   -trend cbc         Medical Decision Making:   I personally reviewed labs:y  I personally reviewed imaging:y  I personally reviewed EKG:-  Toxic drug monitoring: lovenox   Discussed case with: pt        Code Status: full code   DVT Prophylaxis: scd/ lovenox   GI Prophylaxis: protonix     Subjective:     Chief Complaint / Reason for Physician Visit  NG replaced today, dislodged, noted with mod green bowel drainage, pt denies pain, no flatus, no nausea. Bowel sounds are quiet. Encourage oob, ok to clamp to walk around then re connect, pt requesting SCD.     Objective:     VITALS:   Last 24hrs VS reviewed since prior progress note. Most recent are:  Patient Vitals for the past 24 hrs:   BP Temp Temp src Pulse Resp SpO2   25 0812 139/84 -- -- 86 -- --   25 0729 (!) 162/103 -- Temporal 90 17 90 %   25 0527 (!) 148/88 98.1 °F (36.7 °C) Oral 73 18 92 %   25 -- -- -- -- 16 --   25 (!) 156/89 99.3 °F (37.4 °C) Oral 86 18 95 % 
    Hospitalist Progress Note    NAME:   Liz Simms   : 1975   MRN: [id number]     Date/Time: 2025 12:11 PM  Patient PCP: Roxy, Pcp    Estimated discharge date: 48 hours  Barriers: NPO, NG tube, advance diet pending clinical improvement, surgical clearance       HOSPITAL COURSE:  Liz Simms is a 49 y.o. female with past medical history of menorrhagia who presented to the emergency department on 25 for evaluation of sudden onset abdominal pain, nausea, and vomiting that began 1 day prior to arrival. Prior surgical hx hysterectomy and says that some of her bowels had to be \"reconstructed.\" Patient reports that she recently upped her dose of Wegovy that she is taking for weight loss and thinks this is contributing to her symptoms.  States she has difficulty keeping liquids and solids down. Denies any fevers, chills, cough, SOB, chest pain, palpitations, or other associated complaints.   In the ED, hemodynamically stable.  Initial CBC and CMP unremarkable.  CT abd/pelvis showed distal small bowel obstruction, likely secondary to internal hernia.  Patient was admitted for further management.  General surgery consulted.  Patient made n.p.o. and NGT placed.  Taken to the OR on  for exploratory laparotomy with lysis of adhesions.      Assessment / Plan:    Small bowel obstruction  - CT abd/pelvis showed distal small bowel obstruction, likely secondary to internal hernia  - NPO  - NGT  - POD #1 ex-lap with lysis of adhesions   - Continue IV fluids per general surgery  - General surgery following     Obesity  - Recently increased her home Wegovy for weight loss, will continue to hold     HTN  - PRN IV hydralazine for SBP >160    Hypokalemia  - Will replaced with IV potassium 40 mEq  - Monitor lytes           Medical Decision Making:   I personally reviewed labs: CBC, BMP  I personally reviewed imaging: CT abdomen/pelvis, KUB, chest x-ray  Toxic drug monitoring: Lovenox for 
    Hospitalist Progress Note    NAME:   Liz Simms   : 1975   MRN: [id number]     Date/Time: 2025 10:59 AM  Patient PCP: Roxy, Pcp    Estimated discharge date: 48 hours  Barriers: Urine cx, bowel movement, advance diet        HOSPITAL COURSE:  Liz Simms is a 49 y.o. female with past medical history of menorrhagia who presented to the emergency department on 25 for evaluation of sudden onset abdominal pain, nausea, and vomiting that began 1 day prior to arrival. Prior surgical hx hysterectomy and says that some of her bowels had to be \"reconstructed.\" Patient reports that she recently upped her dose of Wegovy that she is taking for weight loss and thinks this is contributing to her symptoms.  States she has difficulty keeping liquids and solids down. Denies any fevers, chills, cough, SOB, chest pain, palpitations, or other associated complaints.   In the ED, hemodynamically stable.  Initial CBC and CMP unremarkable.  CT abd/pelvis showed distal small bowel obstruction, likely secondary to internal hernia.  Patient was admitted for further management.  General surgery consulted.  Patient made n.p.o. and NGT placed.  Taken to the OR on  for exploratory laparotomy with lysis of adhesions. Patient passing gas, initiated on clear liquid diet.       Assessment / Plan:    Small bowel obstruction  - CT abd/pelvis showed distal small bowel obstruction, likely secondary to internal hernia  - NGT  - POD #2 ex-lap with lysis of adhesions   - Passing flatus, no BM yet   - Initiated on clear liquid diet   - General surgery following     Obesity  - Recently increased her home Wegovy for weight loss, will continue to hold     HTN  - PRN IV hydralazine for SBP >160    Hypokalemia  - Will replaced with IV potassium 40 mEq  - Monitor lytes     5. UTI  - Initiated on IV ceftriaxone  - Febrile overnight  - Follow urine cx      Medical Decision Making:   I personally reviewed labs: CBC, 
    Hospitalist Progress Note    NAME:   Liz Simms   : 1975   MRN: [id number]     Date/Time: 2025 12:00 PM  Patient PCP: Roxy, Pcp    /Estimated discharge date: v   Barriers:  Advancing diet, Surgery clearance      HOSPITAL COURSE:  Liz Simms is a 49 y.o. female with past medical history of menorrhagia who presented to the emergency department on 25 for evaluation of sudden onset abdominal pain, nausea, and vomiting that began 1 day prior to arrival. Prior surgical hx hysterectomy and says that some of her bowels had to be \"reconstructed.\" Patient reports that she recently upped her dose of Wegovy that she is taking for weight loss and thinks this is contributing to her symptoms.  States she has difficulty keeping liquids and solids down. Denies any fevers, chills, cough, SOB, chest pain, palpitations, or other associated complaints.   In the ED, hemodynamically stable.  Initial CBC and CMP unremarkable.  CT abd/pelvis showed distal small bowel obstruction, likely secondary to internal hernia.  Patient was admitted for further management.  General surgery consulted.  Patient made n.p.o. and NGT placed.  Taken to the OR on  for exploratory laparotomy with lysis of adhesions. Patient passing gas, initiated on clear liquid diet.       Assessment / Plan:    Small bowel obstruction  - CT abd/pelvis showed distal small bowel obstruction, likely secondary to internal hernia  - NGT discontinued  - POD #4 ex-lap with lysis of adhesions   - Patient had a bowel movement overnight  Started on clear liquid diet  Advance as per general surgery  General Surgery consult appreciated, continue to follow recommendations    Obesity  - Recently increased her home Wegovy for weight loss, will continue to hold     HTN  - PRN IV hydralazine for SBP >160    Hypokalemia  - Continue IV replacement given NPO status  - Continue IV NaCl with KCl 20 mEq at 75 mL/hr   - Monitor lytes 
    Hospitalist Progress Note    NAME:   Liz Simms   : 1975   MRN: [id number]     Date/Time: 2025 9:33 AM  Patient PCP: Roxy, Pcp    Estimated discharge date: 48 hours  Barriers:  bowel movement, advance diet        HOSPITAL COURSE:  Liz Simms is a 49 y.o. female with past medical history of menorrhagia who presented to the emergency department on 25 for evaluation of sudden onset abdominal pain, nausea, and vomiting that began 1 day prior to arrival. Prior surgical hx hysterectomy and says that some of her bowels had to be \"reconstructed.\" Patient reports that she recently upped her dose of Wegovy that she is taking for weight loss and thinks this is contributing to her symptoms.  States she has difficulty keeping liquids and solids down. Denies any fevers, chills, cough, SOB, chest pain, palpitations, or other associated complaints.   In the ED, hemodynamically stable.  Initial CBC and CMP unremarkable.  CT abd/pelvis showed distal small bowel obstruction, likely secondary to internal hernia.  Patient was admitted for further management.  General surgery consulted.  Patient made n.p.o. and NGT placed.  Taken to the OR on  for exploratory laparotomy with lysis of adhesions. Patient passing gas, initiated on clear liquid diet.       Assessment / Plan:    Small bowel obstruction  - CT abd/pelvis showed distal small bowel obstruction, likely secondary to internal hernia  - NGT  - POD #3 ex-lap with lysis of adhesions   - Passing flatus, no BM yet   - Downgraded to NPO last night   - General surgery following     Obesity  - Recently increased her home Wegovy for weight loss, will continue to hold     HTN  - PRN IV hydralazine for SBP >160    Hypokalemia  - Continue IV replacement given NPO status  - Continue IV NaCl with KCl 20 mEq at 75 mL/hr   - Monitor lytes     5. UTI  -  IV ceftriaxone day #2/3  - Febrile overnight  - urine cx negative for growth       Medical 
  Physician Progress Note      PATIENT:               KEVIN POSADA  [facility] #:                  [id number]  :                       1975  ADMIT DATE:       2025 9:02 AM  DISCH DATE:  RESPONDING  PROVIDER #:        Deb Galeas MD          QUERY TEXT:    Bowel obstruction is documented in the medical record. Per the operative   notes-\"Pre-Op Diagnosis Codes: Abdominal adhesions  Post-Op Diagnosis: Same  Procedure(s):LAPAROTOMY EXPLORATORY WITH LYSIS OF   ADHESIONS\". However per the progress notes of -\"Small bowel obstruction-   CT abd/pelvis showed distal small bowel obstruction, likely secondary to   internal hernia\"      Please clarify the cause of the bowel obstruction:    The clinical indicators include:  -CT A/P-INDICATION: Sudden onset abdominal pain nausea vomiting history of   abdominal  surgery  IMPRESSION: Distal small bowel obstruction, likely secondary to internal   hernia.    -Per surgery-\"Kevin Posada is an 49 y.o. who has had a   surgical history of myomectomies and eventually a hysterectomy in 2019, never   had an episode of bowel obstruction, presents today with a one day history of   acute onset of abdominal pain associated with nausea and vomiting along with   obstipation, with no flatus or stool since the pain started.\"    -Per surgery-\"Partial small bowel obstruction, presumably adhesion   related.  Concern for possible internal hernia based on findings on the CT   scan.  Patient has a significantly complicated past surgical history as   previously noted\"    operative notes-\"Date of Procedure: 2025    Pre-Op Diagnosis Codes: Abdominal adhesions  Post-Op Diagnosis: Same    Procedure(s):  LAPAROTOMY EXPLORATORY WITH LYSIS OF ADHESIONS        -Per PN-\"1. Small bowel obstruction  - CT abd/pelvis showed distal small bowel obstruction, likely secondary to   internal hernia  - POD #3 ex-lap with lysis of adhesions\"  Options provided:  -- 
,OCCUPATIONAL THERAPY EVALUATION AND DISCHARGE  Patient: Liz Simms (49 y.o. female)  Date: 7/22/2025  Primary Diagnosis: Abdominal pain [R10.9]  Procedure(s) (LRB):  LAPAROTOMY EXPLORATORY WITH LYSIS OF ADHESIONS (N/A) 2 Days Post-Op   Precautions: NPO                Recommendations for nursing mobility: AD and gt belt for bed to chair  and Amb to bathroom with AD and gait belt    In place during session:Peripheral IV, EKG/telemetry , and NG Tube  ASSESSMENT  Pt is a 49 y.o. female admitted 7/17/2025  and currently being treated for small bowel obstruction, HTN, hypokalemia, and UTI. Pt received semi-supine in chair upon arrival, AXO x4, and agreeable to OT evaluation.     Based on the objective data described below pt currently present at baseline independence/CGA for ADLs and function mobility/transfers at this time. (See below for objective details and assist levels).     Overall pt tolerated session well today with 0/10 pain initially then 6/10 pain with activity. Pt IND scooted to edge of chair in preparation for functional mobility. While sitting in chair pt required Tye for UB dressing lacing BUE through gown and around backside. Prior to functional mobility in preparation for ADL participation, pt was educated on hand placement and pushing up from chair to complete functional transfers. Pt required CGA to transition from sitting in chair to standing with use of gait belt and RW. While standing, pt ambulated to bathroom with CGA to complete toileting. Pt completed all aspects of toileting IND and washed hands afterwards IND. Pt then ambulated back to the chair with CGA. Pt transitioned from standing to sitting in chair with CGA.  Pt has no skilled acute OT needs at this time either noted by OT or reported by pt, will DC skilled OT following evaluation; pt verbalized understanding and agreement.  Current OT DC recommendation No post acute skilled occupational therapy, return to previous living 
4 Eyes Skin Assessment     NAME:  Liz Smims  YOB: 1975  MEDICAL RECORD NUMBER:  [id number]    The patient is being assessed for  Other ***    I agree that at least one RN has performed a thorough Head to Toe Skin Assessment on the patient. ALL assessment sites listed below have been assessed.      Areas assessed by both nurses:    Head, Face, Ears, Shoulders, Back, Chest, Arms, Elbows, Hands, Sacrum. Buttock, Coccyx, Ischium, Legs. Feet and Heels, and Under Medical Devices         Does the Patient have a Wound? {Action Wound:00752}       Roshan Prevention initiated by RN: {YES/NO:19726}  Wound Care Orders initiated by RN: {YES/NO:19726}    For hospital-acquired stage 1 & 2 and ALL Stage 3,4, Unstageable, DTI, NWPT, and Complex wounds: place order “IP Wound Care/Ostomy Nurse Eval and Treat” by RN under : {YES/NO:19732}    New Ostomies, if present place, Ostomy referral order under : {YES/NO:19726}     Nurse 1 eSignature: {Esignature:[id number]}    **SHARE this note so that the co-signing nurse can place an eSignature**    Nurse 2 eSignature: {Esignature:[id number]}   
Admit Date: 2025    POD * No surgery found *    Procedure:  * No surgery found *    Subjective:     Patient continues to complain of abdominal bloating and fullness.  She has had some gas pains.  She is passed no flatus and has had no bowel movement yet..     Gastrografin challenge overnight does not demonstrate passage of contrast into the colon.    Objective:     Blood pressure (!) 133/91, pulse 93, temperature 99 °F (37.2 °C), temperature source Oral, resp. rate 18, height 1.753 m (5' 9\"), weight 124.7 kg (275 lb), SpO2 94%.    Temp (24hrs), Av.1 °F (37.3 °C), Min:99 °F (37.2 °C), Max:99.3 °F (37.4 °C)      Physical Exam:  GENERAL: alert, appears stated age, and cooperative, LUNG: clear to auscultation bilaterally, HEART: regular rate and rhythm, S1, S2 normal, no murmur, click, rub or gallop, ABDOMEN: Distended and tympanitic but generally nontender, EXTREMITIES:  extremities normal, atraumatic, no cyanosis or edema    Labs:   Recent Results (from the past 24 hours)   CBC with Auto Differential    Collection Time: 25  8:27 AM   Result Value Ref Range    WBC 8.0 3.6 - 11.0 K/uL    RBC 4.63 3.80 - 5.20 M/uL    Hemoglobin 13.3 11.5 - 16.0 g/dL    Hematocrit 40.5 35.0 - 47.0 %    MCV 87.5 80.0 - 99.0 FL    MCH 28.7 26.0 - 34.0 PG    MCHC 32.8 30.0 - 36.5 g/dL    RDW 13.1 11.5 - 14.5 %    Platelets 308 150 - 400 K/uL    MPV 11.0 8.9 - 12.9 FL    Nucleated RBCs 0.0 0.0  WBC    nRBC 0.00 0.00 - 0.01 K/uL    Neutrophils % 73.3 32.0 - 75.0 %    Lymphocytes % 16.5 12.0 - 49.0 %    Monocytes % 9.4 5.0 - 13.0 %    Eosinophils % 0.2 0.0 - 7.0 %    Basophils % 0.4 0.0 - 1.0 %    Immature Granulocytes % 0.2 0 - 0.5 %    Neutrophils Absolute 5.87 1.80 - 8.00 K/UL    Lymphocytes Absolute 1.32 0.80 - 3.50 K/UL    Monocytes Absolute 0.75 0.00 - 1.00 K/UL    Eosinophils Absolute 0.02 0.00 - 0.40 K/UL    Basophils Absolute 0.03 0.00 - 0.10 K/UL    Immature Granulocytes Absolute 0.02 0.00 - 0.04 K/UL    
Admit Date: 2025    POD * No surgery found *    Procedure:  * No surgery found *    Subjective:     Patient continues to complain of abdominal bloating and fullness.  She has had some gas pains.  She is passed no flatus and has had no bowel movement yet..     Gastrografin challenge overnight does not demonstrate passage of contrast into the colon.    Objective:     Blood pressure (!) 163/102, pulse 95, temperature 98.4 °F (36.9 °C), temperature source Oral, resp. rate 18, height 1.753 m (5' 9\"), weight 124.7 kg (275 lb), SpO2 98%.    Temp (24hrs), Av.7 °F (37.1 °C), Min:98.4 °F (36.9 °C), Max:99 °F (37.2 °C)      Physical Exam:  GENERAL: alert, appears stated age, and cooperative, LUNG: clear to auscultation bilaterally, HEART: regular rate and rhythm, S1, S2 normal, no murmur, click, rub or gallop, ABDOMEN: Distended and tympanitic but generally nontender, EXTREMITIES:  extremities normal, atraumatic, no cyanosis or edema    Labs:   Recent Results (from the past 24 hours)   CBC with Auto Differential    Collection Time: 25  8:27 AM   Result Value Ref Range    WBC 8.0 3.6 - 11.0 K/uL    RBC 4.63 3.80 - 5.20 M/uL    Hemoglobin 13.3 11.5 - 16.0 g/dL    Hematocrit 40.5 35.0 - 47.0 %    MCV 87.5 80.0 - 99.0 FL    MCH 28.7 26.0 - 34.0 PG    MCHC 32.8 30.0 - 36.5 g/dL    RDW 13.1 11.5 - 14.5 %    Platelets 308 150 - 400 K/uL    MPV 11.0 8.9 - 12.9 FL    Nucleated RBCs 0.0 0.0  WBC    nRBC 0.00 0.00 - 0.01 K/uL    Neutrophils % 73.3 32.0 - 75.0 %    Lymphocytes % 16.5 12.0 - 49.0 %    Monocytes % 9.4 5.0 - 13.0 %    Eosinophils % 0.2 0.0 - 7.0 %    Basophils % 0.4 0.0 - 1.0 %    Immature Granulocytes % 0.2 0 - 0.5 %    Neutrophils Absolute 5.87 1.80 - 8.00 K/UL    Lymphocytes Absolute 1.32 0.80 - 3.50 K/UL    Monocytes Absolute 0.75 0.00 - 1.00 K/UL    Eosinophils Absolute 0.02 0.00 - 0.40 K/UL    Basophils Absolute 0.03 0.00 - 0.10 K/UL    Immature Granulocytes Absolute 0.02 0.00 - 0.04 K/UL    
Comprehensive Nutrition Assessment    Type and Reason for Visit:  Initial, NPO/clear liquid    Nutrition Recommendations/Plan:   Rec advance diet as medically appropriate  Encourage PO intakes >50%  Monitor/document wt, BM, PO% in I/O     Malnutrition Assessment:  Malnutrition Status:  No malnutrition (07/21/25 1424)    Context:  Acute Illness     Findings of the 6 clinical characteristics of malnutrition:  Energy Intake:  Mild decrease in energy intake (npo)  Weight Loss:  No weight loss     Body Fat Loss:  No body fat loss     Muscle Mass Loss:  No muscle mass loss    Fluid Accumulation:  No fluid accumulation     Strength:  Not Performed    Nutrition Assessment:    p/w abdomindal pain, n/v; underwent laparotomy exploratory w/ lysis of adhesions on 7/20, bowel was able to be run again; no BM since admission, pt currently not passing gas either; visited pt in room, cousin was present, both were very pleasant; pt reports no n/v/d, pt also reports no nausea; pt asked when she'd be able to have po intake, told pt I wasn't sure but I would f/u; pt has NG tube w/ output, 500ml and 50ml this morning; rec advancing diet as medically appropriate; labs and meds reviewed, K low; pt on prptonix    Nutrition Related Findings:    No malnutrition findings; No n/v/d; No edema; No wounds; LBM 7/17, still not passing gas post-op Wound Type: None, Surgical Incision (ex lap)       Current Nutrition Intake & Therapies:    Average Meal Intake: NPO  Average Supplements Intake: NPO  Diet NPO Exceptions are: Ice Chips, Sips of Water with Meds, Sips of Clear Liquids    Anthropometric Measures:  Height: 175.3 cm (5' 9.02\")  Ideal Body Weight (IBW): 145 lbs (66 kg)       Current Body Weight: 124.7 kg (274 lb 14.6 oz), 189.6 % IBW. Weight Source: Stated  Current BMI (kg/m2): 40.6                             BMI Categories: Obese Class 3 (BMI 40.0 or greater)    Estimated Daily Nutrient Needs:  Energy Requirements Based On: Formula  Weight 
Comprehensive Nutrition Assessment    Type and Reason for Visit:  Reassess    Nutrition Recommendations/Plan:   Continue current diet  Monitor I/Os, wt, labs, Bms  Monitor diet tolerance     Malnutrition Assessment:  Malnutrition Status:  No malnutrition (07/21/25 1424)    Context:  Acute Illness     Findings of the 6 clinical characteristics of malnutrition:  Energy Intake:  Mild decrease in energy intake (npo)  Weight Loss:  No weight loss     Body Fat Loss:  No body fat loss     Muscle Mass Loss:  No muscle mass loss    Fluid Accumulation:  No fluid accumulation     Strength:  Not Performed    Nutrition Assessment:    48 y/o p/w abdomindal pain, n/v; underwent laparotomy exploratory w/ lysis of adhesions on 7/20, bowel was able to be run again; no BM since admission, pt currently not passing gas either; visited pt in room, cousin was present, both were very pleasant; pt reports no n/v/d, pt also reports no nausea; pt asked when she'd be able to have po intake, told pt I wasn't sure but I would f/u; pt has NG tube w/ output, 500ml and 50ml this morning; rec advancing diet as medically appropriate; labs and meds reviewed, K low; pt on prptonix. (7/24) Diet advanced to regular, low fiber, noted NGT removed. Appears to be passing BM's more regularly. Per MD physical exam note pt still distended w/ abdominal tenderness. Visited pt at bedside she was pleasant and agreeable to interview. Pt stated her appetite recently has been 'okay' and endorses approx 75% intakes of meals. Pt states she is tolerating diet advancement well and had no adverse issues thus far. Pt has many questions regarding discharge, including whether or not she should resume taking wegovy, defer to MD, and what her diet should be - requested diet education prior to discharge.    Nutrition Related Findings:    Edema LLE, no wounds, LBM 7/23. Per MD note passing flatus. Wound Type: None, Surgical Incision (ex lap)     Last BM: 07/23/25  Edema: Left 
Department of General Surgery - Adult  Surgical Service    Progress Note      SUBJECTIVE:  Doing well POD#1. Pain controlled on morphine, having nausea after her dose. +Noriega. No flatus or BM. IS 1500.    OBJECTIVE      Physical    VITALS:  /87   Pulse (!) 104   Temp 98.8 °F (37.1 °C) (Oral)   Resp 15   Ht 1.753 m (5' 9\")   Wt 124.7 kg (275 lb)   SpO2 92%   BMI 40.61 kg/m²   INTAKE/OUTPUT:    Intake/Output Summary (Last 24 hours) at 7/21/2025 1141  Last data filed at 7/21/2025 0849  Gross per 24 hour   Intake 3254.01 ml   Output 1775 ml   Net 1479.01 ml     NAD  NCAT  NG with bilious output  No scleral icterus  Normal WOB on RA. IS 1500.   Abd soft, distended. Appropriately tender to palpation  OR dressing in place. Mild shadowing  Ext WWP   +Noriega  Neuro intact  Affect appropriate    Data  Reviewed     Current Inpatient Medications    Reviewed     ASSESSMENT AND PLAN    49 y.o. female status post ex lap, adhesiolysis for SBO post op day #  1.    - NPO, NG. AROBF  - IVF  - Pain control  - Abdominal binder  - D/c noriega  - OOB TID  - Pulmonary toilet  - DVT ppx      Deb Galeas MD   July 21, 2025   
Department of General Surgery - Adult  Surgical Service    Progress Note      SUBJECTIVE:  Doing well POD#2. Pain controlled. Voiding. Passing flatus, no nausea.  overnight. Febrile.     OBJECTIVE      Physical    VITALS:  /83   Pulse 95   Temp 98.6 °F (37 °C) (Oral)   Resp 13   Ht 1.753 m (5' 9.02\")   Wt 124.7 kg (275 lb)   SpO2 92%   BMI 40.59 kg/m²   INTAKE/OUTPUT:    Intake/Output Summary (Last 24 hours) at 7/22/2025 1305  Last data filed at 7/22/2025 1057  Gross per 24 hour   Intake 336 ml   Output 1700 ml   Net -1364 ml     NAD  NCAT  NG with bilious output  No scleral icterus  Normal WOB on RA. IS 1500.   Abd soft, distended. Appropriately tender to palpation  OR dressing in place. Mild shadowing  Ext WWP   Neuro intact  Affect appropriate    Data  Reviewed     Current Inpatient Medications    Reviewed     ASSESSMENT AND PLAN    49 y.o. female status post ex lap, adhesiolysis for SBO post op day #  2.    - Clamp NG. CLD.  - IVF  - Pain control  - Abdominal binder  - OOB TID  - Pulmonary toilet  - DVT ppx      Deb Galeas MD   July 22, 2025   
Department of General Surgery - Adult  Surgical Service    Progress Note      SUBJECTIVE:  Doing well POD#3. Pain controlled. Voiding. Passing flatus, no nausea.  overnight. Tmax 100.2. Good urine output.    OBJECTIVE      Physical    VITALS:  /80   Pulse 99   Temp 98.4 °F (36.9 °C) (Oral)   Resp 18   Ht 1.753 m (5' 9.02\")   Wt 124.7 kg (275 lb)   SpO2 95%   BMI 40.59 kg/m²   INTAKE/OUTPUT:    Intake/Output Summary (Last 24 hours) at 7/23/2025 1255  Last data filed at 7/23/2025 0930  Gross per 24 hour   Intake 390 ml   Output 2100 ml   Net -1710 ml     NAD  NCAT  NG with bilious output  No scleral icterus  Normal WOB on RA. IS 1500.   Abd soft, distended. Appropriately tender to palpation  OR dressing in place. Mild shadowing  Ext WWP   Neuro intact  Affect appropriate    Data  Reviewed     Current Inpatient Medications    Reviewed     ASSESSMENT AND PLAN    49 y.o. female status post ex lap, adhesiolysis for SBO post op day #  3.    - Clamp NG. CLD.  - IVF  - Pain control  - Abdominal binder  - OOB TID  - Pulmonary toilet  - DVT ppx      Deb Galeas MD   July 23, 2025   
Department of General Surgery - Adult  Surgical Service    Progress Note      SUBJECTIVE:  Doing well POD#4. Pain controlled. Voiding. Passing flatus and having Bms. Tolerating diet. Afebrile    OBJECTIVE      Physical    VITALS:  /80   Pulse 92   Temp 98.1 °F (36.7 °C) (Oral)   Resp 20   Ht 1.753 m (5' 9.02\")   Wt 124.7 kg (275 lb)   SpO2 94%   BMI 40.59 kg/m²   INTAKE/OUTPUT:    Intake/Output Summary (Last 24 hours) at 7/24/2025 1852  Last data filed at 7/24/2025 1621  Gross per 24 hour   Intake 840 ml   Output 750 ml   Net 90 ml     NAD  NCAT  No scleral icterus  Normal WOB on RA. IS 1500.   Abd soft, distended. Appropriately tender to palpation  Midline well approximated with staples, scant serous drainage.  Ext WWP   Neuro intact  Affect appropriate    Data  Reviewed     Current Inpatient Medications    Reviewed     ASSESSMENT AND PLAN    49 y.o. female status post ex lap, adhesiolysis for SBO post op day #  4.    - Regular diet  - MLIV  - Pain control  - Abdominal binder  - OOB TID  - Pulmonary toilet  - DVT ppx  - Anticipate d/c home tomorrow      Deb Galeas MD   July 24, 2025   
Lu removed, no complications. Pt resting comfortably, call light in reach.   
MD Zaidi notified that the patient had Lovenox this morning.   
Patient transferred to room 434 via bed in stable condition.  Bedside report given, SBAR reviewed, sites viewed. Patients family at the bedside.   
Spiritual Health History and Assessment/Progress Note  [facility]    Initial Encounter,  ,  ,      Name: Liz Simms MRN: [id number]    Age: 49 y.o.     Sex: female   Language: English   Scientologist: Yarsanism   Abdominal pain     Date: 7/18/2025                           Spiritual Assessment began in [facility]        Referral/Consult From: Rounding   Encounter Overview/Reason: Initial Encounter  Service Provided For: Patient    Cinthya, Belief, Meaning:   Patient is connected with a cinthya tradition or spiritual practice and has beliefs or practices that help with coping during difficult times  Family/Friends Other: unable to assess.      Importance and Influence:  Patient has spiritual/personal beliefs that influence decisions regarding their health  Family/Friends have spiritual/personal beliefs that influence decisions regarding the patient's health    Community:  Patient feels well-supported. Support system includes: Extended family  Family/Friends Other: unable to assess.    Assessment and Plan of Care:     Patient Interventions include: Facilitated expression of thoughts and feelings, Explored spiritual coping/struggle/distress, and Affirmed coping skills/support systems  Family/Friends Interventions include: Other: unable to assess.    Patient Plan of Care: Spiritual Care available upon further referral  Family/Friends Plan of Care: Spiritual Care available upon further referral    Electronically signed by Shannon Winterslain Intern on 7/18/2025 at 11:07 AM   
To whom it may concern,    This is to certify that Ms. Liz Simms  1975 was admitted to [facility] from 25 to 25.  She is advised to refrain from work until her outpatient follow-up with general surgery.    Please feel free to reach out to our office for any questions or concerns      Abdifatah Case MD  
*      Plan/Recommendations/Medical Decision Making:     CT findings of internal hernia are potentially concerning as this may represent closed-loop obstruction which would be at higher risk for subsequent bowel ischemia and perforation.  I would generally have a lower threshold to proceed with laparotomy.  Previous surgical records from [facility] were reviewed.  She had very complex laparotomy for large fibroid tumors of the uterus.  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  Procedure reportedly required 7 hours of dissection time.  Given this significantly complicated past surgical history, a more cautious approach may be warranted.  We will begin a Gastrografin contrast challenge today in hopes of seeing signs of contrast passing through to the cecum.  If Gastrografin challenge demonstrates ongoing obstruction then laparotomy would likely be required.    Francis Cisneros MD  [facility]

## 2025-07-25 NOTE — CARE COORDINATION
Patient accepted with [facility], clear to d/c from  to home with HH at this time.    Transition of Care Plan:    RUR: 7%  Prior Level of Functioning: independent  Disposition: home health  CELI: 7/25/25  If SNF or IPR: Date FOC offered: na  Date FOC received: na  Accepting facility: [facility]  Date authorization started with reference number: na  Date authorization received and expires: na  Follow up appointments: na  DME needed: na  Transportation at discharge: family  IM/IMM Medicare/ letter given: na  Is patient a  and connected with [facility]? na   If yes, was [place] transfer form completed and [facility] notified? na  Caregiver Contact: patient  Discharge Caregiver contacted prior to discharge? Patient aware  Care Conference needed? na  Barriers to discharge: na

## 2025-07-28 ENCOUNTER — TELEPHONE (OUTPATIENT)
Age: 50
End: 2025-07-28

## 2025-07-28 ENCOUNTER — OFFICE VISIT (OUTPATIENT)
Age: 50
End: 2025-07-28

## 2025-07-28 VITALS
RESPIRATION RATE: 17 BRPM | HEART RATE: 83 BPM | OXYGEN SATURATION: 94 % | SYSTOLIC BLOOD PRESSURE: 113 MMHG | DIASTOLIC BLOOD PRESSURE: 79 MMHG | WEIGHT: 270.6 LBS | HEIGHT: 69 IN | BODY MASS INDEX: 40.08 KG/M2 | TEMPERATURE: 97.8 F

## 2025-07-28 DIAGNOSIS — Z09 POSTOP CHECK: Primary | ICD-10-CM

## 2025-07-28 PROCEDURE — 99024 POSTOP FOLLOW-UP VISIT: CPT | Performed by: PHYSICIAN ASSISTANT

## 2025-07-28 RX ORDER — ACETAMINOPHEN 325 MG/1
650 TABLET ORAL EVERY 6 HOURS PRN
COMMUNITY

## 2025-07-28 RX ORDER — MULTIVITAMIN WITH IRON
1 TABLET ORAL DAILY
COMMUNITY

## 2025-07-28 ASSESSMENT — PATIENT HEALTH QUESTIONNAIRE - PHQ9
SUM OF ALL RESPONSES TO PHQ QUESTIONS 1-9: 0
2. FEELING DOWN, DEPRESSED OR HOPELESS: NOT AT ALL
1. LITTLE INTEREST OR PLEASURE IN DOING THINGS: NOT AT ALL
SUM OF ALL RESPONSES TO PHQ QUESTIONS 1-9: 0

## 2025-07-28 NOTE — TELEPHONE ENCOUNTER
Patient called today demanding to be seen today to  have her sutures removed. She said that she works with Dr. Cisneros.She had surgery on 7/20/25 and said that Dr. Cisneros did her surgery but she was not seeing him or . I spoke with Alisia the manager and she asked that I speak to Trina Curiel. Trina said that she would see her this afternoon but she thinks it is too soon to remove sutures. I gave patient an appointment today at 2:30 pm. [phone].

## 2025-07-28 NOTE — PROGRESS NOTES
Identified patient with two patient identifiers (name and ). Reviewed chart in preparation for visit and have obtained necessary documentation.    Liz Simms is a 49 y.o. female  Chief Complaint   Patient presents with    Post-Op Check     Staple removal     /79 (BP Site: Right Upper Arm, Patient Position: Sitting, BP Cuff Size: Large Adult)   Pulse 83   Temp 97.8 °F (36.6 °C) (Oral)   Resp 17   Ht 1.753 m (5' 9.02\")   Wt 122.7 kg (270 lb 9.6 oz)   SpO2 94%   BMI 39.94 kg/m²     1. Have you been to the ER, urgent care clinic since your last visit?  Hospitalized since your last visit?no    2. Have you seen or consulted any other health care providers outside of the [facility] since your last visit?  Include any pap smears or colon screening. no

## 2025-07-29 NOTE — ANESTHESIA POSTPROCEDURE EVALUATION
Department of Anesthesiology  Postprocedure Note    Patient: Liz Simms  MRN: [id number]  YOB: 1975    Procedure Summary       Date: 07/20/25 Room / Location: [facility] MAIN OR 02 / [facility] MAIN OR    Anesthesia Start: 1535 Anesthesia Stop: 1709    Procedure: LAPAROTOMY EXPLORATORY WITH LYSIS OF ADHESIONS (Abdomen) Diagnosis:       Abdominal adhesions      (Abdominal adhesions [K66.0])    Surgeons: Francis Cisneros MD Responsible Provider: Reinaldo Zaidi MD    Anesthesia Type: general ASA Status: 3 - Emergent            Anesthesia Type: No value filed.    Charo Phase I: Charo Score: 8    Charo Phase II:      Anesthesia Post Evaluation    Patient location during evaluation: PACU  Patient participation: complete - patient cannot participate  Level of consciousness: awake  Pain score: 0  Airway patency: patent  Nausea & Vomiting: no nausea and no vomiting  Cardiovascular status: hemodynamically stable  Respiratory status: acceptable  Hydration status: stable  Multimodal analgesia pain management approach        No notable events documented.

## 2025-07-29 NOTE — PROGRESS NOTES
[facility]  Yesenia Curiel PA-C  [address], [place] [address]    General Surgery Follow Up    Patient Name: Liz Simms (49 y.o., female)    Patient Address: [address] [place], [place] [id number]    PCP: Roxy, Pcp       Subjective:      Liz Simms is a 49 y.o. female presents for postop care following exploratory laparotomy with lysis of adhesions on 07/20/2025.  Appetite is normal.  Bowel movements are alternating constipation and regularity. The patient is voiding without difficulty. Patient denies fever, chills, nausea. Pain is largely controlled at this point when stationary. Has been using tylenol for pain, which helps some. Endorses a lot of pain (7 or 8 out of 10) with sitting up and with walking longer distances. 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     Prior to surgery was taking wegovy but has since stopped due to concerns with association with SBO.     Endorses itching of midline incision. Would like staples removed as they are uncomfortable.     Past Medical History:   Diagnosis Date    H/O seasonal allergies     History of heavy vaginal bleeding     Sinusitis     Vaginal fibroids        Past Surgical History:   Procedure Laterality Date    IR EMBOLIZATION TUMOR/ORGAN      LAPAROTOMY N/A 7/20/2025    LAPAROTOMY EXPLORATORY WITH LYSIS OF ADHESIONS performed by Francis Cisneros MD at [facility] MAIN OR    MYOMECTOMY      X2        Family History   Problem Relation Age of Onset    Hypertension Father     Hypertension Mother        Social History     Tobacco Use    Smoking status: Never    Smokeless tobacco: Never   Substance Use Topics    Alcohol use: Yes    Drug use: No       Current Outpatient Medications   Medication Sig Dispense Refill    acetaminophen (TYLENOL) 325 MG tablet Take 2 tablets by mouth every 6 hours as needed for Pain      Multiple Vitamin

## 2025-07-30 NOTE — PROGRESS NOTES
Chart review completed all necessary documentation appears to be present.  Identified patient with two patient identifiers (name and ). Reviewed chart in preparation for visit and have obtained necessary documentation.    Liz Simms is a 49 y.o. female  Chief Complaint   Patient presents with    Post-Op Check     Wound check     /79 (BP Site: Left Upper Arm, Patient Position: Sitting, BP Cuff Size: Large Adult)   Pulse 72   Temp 98.9 °F (37.2 °C) (Oral)   Resp 17   Ht 1.753 m (5' 9.02\")   Wt 121.9 kg (268 lb 12.8 oz)   SpO2 95%   BMI 39.67 kg/m²     1. Have you been to the ER, urgent care clinic since your last visit?  Hospitalized since your last visit?no    2. Have you seen or consulted any other health care providers outside of the [facility] since your last visit?  Include any pap smears or colon screening. no

## 2025-07-31 ENCOUNTER — OFFICE VISIT (OUTPATIENT)
Age: 50
End: 2025-07-31

## 2025-07-31 VITALS
SYSTOLIC BLOOD PRESSURE: 120 MMHG | OXYGEN SATURATION: 95 % | TEMPERATURE: 98.9 F | HEIGHT: 69 IN | HEART RATE: 72 BPM | DIASTOLIC BLOOD PRESSURE: 79 MMHG | BODY MASS INDEX: 39.81 KG/M2 | WEIGHT: 268.8 LBS | RESPIRATION RATE: 17 BRPM

## 2025-07-31 DIAGNOSIS — Z51.89 VISIT FOR WOUND CHECK: ICD-10-CM

## 2025-07-31 DIAGNOSIS — Z09 POSTOP CHECK: Primary | ICD-10-CM

## 2025-07-31 DIAGNOSIS — R07.81 RIB PAIN: ICD-10-CM

## 2025-07-31 DIAGNOSIS — R19.5 HARD STOOL: ICD-10-CM

## 2025-07-31 DIAGNOSIS — G89.18 POST-OP PAIN: ICD-10-CM

## 2025-07-31 PROCEDURE — 99024 POSTOP FOLLOW-UP VISIT: CPT | Performed by: PHYSICIAN ASSISTANT

## 2025-07-31 RX ORDER — DOCUSATE SODIUM 100 MG/1
100 CAPSULE, LIQUID FILLED ORAL 2 TIMES DAILY PRN
Qty: 60 CAPSULE | Refills: 0 | Status: SHIPPED | OUTPATIENT
Start: 2025-07-31

## 2025-07-31 RX ORDER — POLYETHYLENE GLYCOL 3350 17 G/17G
17 POWDER, FOR SOLUTION ORAL DAILY PRN
Qty: 510 G | Refills: 0 | Status: SHIPPED | OUTPATIENT
Start: 2025-07-31 | End: 2025-08-30

## 2025-07-31 RX ORDER — CYCLOBENZAPRINE HCL 5 MG
5 TABLET ORAL 2 TIMES DAILY PRN
Qty: 20 TABLET | Refills: 0 | Status: SHIPPED | OUTPATIENT
Start: 2025-07-31 | End: 2025-08-10

## 2025-07-31 ASSESSMENT — PATIENT HEALTH QUESTIONNAIRE - PHQ9
SUM OF ALL RESPONSES TO PHQ QUESTIONS 1-9: 0
1. LITTLE INTEREST OR PLEASURE IN DOING THINGS: NOT AT ALL
SUM OF ALL RESPONSES TO PHQ QUESTIONS 1-9: 0
2. FEELING DOWN, DEPRESSED OR HOPELESS: NOT AT ALL
SUM OF ALL RESPONSES TO PHQ QUESTIONS 1-9: 0
SUM OF ALL RESPONSES TO PHQ QUESTIONS 1-9: 0

## 2025-07-31 NOTE — PROGRESS NOTES
[facility]  Yesenia Curiel PA-C  [address], [place] [address]    General Surgery Follow Up    Patient Name: Liz Simms (49 y.o., female)    Patient Address: [address] [facility] [address]    PCP: Roxy, Pcp       Subjective:      Liz Simms is a 49 y.o. female presents for postop care following exploratory laparotomy with lysis of adhesions on 07/20/2025.  Appetite is normal.  She has noticed some hardening of the stool but is having regular BM. The patient is voiding without difficulty. Patient denies fever, chills, nausea, vomiting. Denies issues with incisions but does endorse continued itching.     She endorses rib pain particularly when moving. She was recently prescribed oxycodone and is taking half of a tablet PRN (mostly at night). Has also been using tylenol.     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 Other concerns regarding having to drive long distances and pain associated with sitting in a driving position and having to turn quickly to see other cars.     Past Medical History:   Diagnosis Date    H/O seasonal allergies     History of heavy vaginal bleeding     Sinusitis     Vaginal fibroids        Past Surgical History:   Procedure Laterality Date    IR EMBOLIZATION TUMOR/ORGAN      LAPAROTOMY N/A 7/20/2025    LAPAROTOMY EXPLORATORY WITH LYSIS OF ADHESIONS performed by Francis Cisneros MD at [facility] MAIN OR    MYOMECTOMY      X2        Family History   Problem Relation Age of Onset    Hypertension Father     Hypertension Mother        Social History     Tobacco Use    Smoking status: Never    Smokeless tobacco: Never   Substance Use Topics    Alcohol use: Yes    Drug use: No       Current Outpatient Medications

## 2025-08-01 ENCOUNTER — TELEPHONE (OUTPATIENT)
Age: 50
End: 2025-08-01

## 2025-08-01 ENCOUNTER — OFFICE VISIT (OUTPATIENT)
Age: 50
End: 2025-08-01

## 2025-08-01 VITALS
DIASTOLIC BLOOD PRESSURE: 77 MMHG | SYSTOLIC BLOOD PRESSURE: 111 MMHG | HEIGHT: 69 IN | RESPIRATION RATE: 19 BRPM | BODY MASS INDEX: 40.58 KG/M2 | HEART RATE: 74 BPM | WEIGHT: 274 LBS | TEMPERATURE: 97 F | OXYGEN SATURATION: 96 %

## 2025-08-01 DIAGNOSIS — R10.84 GENERALIZED ABDOMINAL PAIN: Primary | ICD-10-CM

## 2025-08-01 PROCEDURE — 99024 POSTOP FOLLOW-UP VISIT: CPT | Performed by: SURGERY

## 2025-08-01 ASSESSMENT — PATIENT HEALTH QUESTIONNAIRE - PHQ9
2. FEELING DOWN, DEPRESSED OR HOPELESS: NOT AT ALL
1. LITTLE INTEREST OR PLEASURE IN DOING THINGS: NOT AT ALL
SUM OF ALL RESPONSES TO PHQ QUESTIONS 1-9: 0

## 2025-08-01 NOTE — TELEPHONE ENCOUNTER
Patient called today requesting to see \"a real doctor\" today. She says that she still has staples and she is concerned about her wound. I told her I would call her back.  I spoke with the manager, Alisia who spoke with Trina Curiel and Dr. Gilliland. Dr. Gilliland agreed to see her today at 11:00 am.I spoke with patient and she is coming in at that time.  Dr. Gilliland says that she is to continue to follow up with him for the future.

## 2025-08-07 ENCOUNTER — OFFICE VISIT (OUTPATIENT)
Age: 50
End: 2025-08-07

## 2025-08-07 VITALS
OXYGEN SATURATION: 93 % | SYSTOLIC BLOOD PRESSURE: 120 MMHG | BODY MASS INDEX: 40.43 KG/M2 | WEIGHT: 273 LBS | HEART RATE: 78 BPM | HEIGHT: 69 IN | DIASTOLIC BLOOD PRESSURE: 81 MMHG | TEMPERATURE: 98 F | RESPIRATION RATE: 18 BRPM

## 2025-08-07 DIAGNOSIS — R10.84 GENERALIZED ABDOMINAL PAIN: Primary | ICD-10-CM

## 2025-08-07 PROCEDURE — 99024 POSTOP FOLLOW-UP VISIT: CPT | Performed by: SURGERY

## 2025-08-14 ENCOUNTER — TELEPHONE (OUTPATIENT)
Age: 50
End: 2025-08-14

## 2025-08-14 ENCOUNTER — OFFICE VISIT (OUTPATIENT)
Age: 50
End: 2025-08-14

## 2025-08-14 VITALS
BODY MASS INDEX: 40.51 KG/M2 | SYSTOLIC BLOOD PRESSURE: 126 MMHG | HEART RATE: 77 BPM | OXYGEN SATURATION: 95 % | WEIGHT: 273.5 LBS | DIASTOLIC BLOOD PRESSURE: 88 MMHG | RESPIRATION RATE: 17 BRPM | TEMPERATURE: 98.1 F | HEIGHT: 69 IN

## 2025-08-14 DIAGNOSIS — K56.50 SMALL BOWEL OBSTRUCTION DUE TO ADHESIONS (HCC): Primary | ICD-10-CM

## 2025-08-14 PROCEDURE — 99024 POSTOP FOLLOW-UP VISIT: CPT | Performed by: SURGERY

## 2025-08-14 ASSESSMENT — PATIENT HEALTH QUESTIONNAIRE - PHQ9
2. FEELING DOWN, DEPRESSED OR HOPELESS: NOT AT ALL
SUM OF ALL RESPONSES TO PHQ QUESTIONS 1-9: 0
1. LITTLE INTEREST OR PLEASURE IN DOING THINGS: NOT AT ALL

## 2025-08-25 ENCOUNTER — TELEPHONE (OUTPATIENT)
Age: 50
End: 2025-08-25

## (undated) DEVICE — NEPTUNE E-SEP SMOKE EVACUATION PENCIL, COATED, 70MM BLADE, PUSH BUTTON SWITCH: Brand: NEPTUNE E-SEP

## (undated) DEVICE — SUTURE VICRYL + SZ 3-0 L27IN ABSRB UD L26MM SH 1/2 CIR VCP416H

## (undated) DEVICE — SUTURE VICRYL + SZ 0 L27IN ABSRB UD L36MM CT-1 1/2 CIR VCPP41D

## (undated) DEVICE — DRESSING FOAM POST OPERATIVE 4X10 IN MEPILEX BORDER AG

## (undated) DEVICE — MAJOR LAP PROCEDURE: Brand: MEDLINE INDUSTRIES, INC.

## (undated) DEVICE — BLADE,CARBON-STEEL,15,STRL,DISPOSABLE,TB: Brand: MEDLINE

## (undated) DEVICE — YANKAUER,BULB TIP,W/O VENT,RIGID,STERILE: Brand: MEDLINE

## (undated) DEVICE — 3M™ SURGICAL CLIPPER WITH PIVOTING HEAD, 9660, 50/CASE: Brand: 3M™

## (undated) DEVICE — STAPLER SKIN H3.9MM WIRE DIA0.58MM CRWN 6.9MM 35 STPL FIX

## (undated) DEVICE — SOUTHSIDE TURNOVER: Brand: MEDLINE INDUSTRIES, INC.

## (undated) DEVICE — 1LYRTR 16FR10ML100%SIL UMS SNP: Brand: MEDLINE INDUSTRIES, INC.

## (undated) DEVICE — GARMENT,MEDLINE,DVT,INT,CALF,MED, GEN2: Brand: MEDLINE

## (undated) DEVICE — BLADE ES L6IN ELASTOMERIC COAT EXT DURABLE BEND UPTO 90DEG

## (undated) DEVICE — BLADE,CARBON-STEEL,10,STRL,DISPOSABLE,TB: Brand: MEDLINE

## (undated) DEVICE — GLOVE ORANGE PI 7 1/2   MSG9075

## (undated) DEVICE — SOLUTION IRRIG 1000ML 09% SOD CHL USP PIC PLAS CONTAINER

## (undated) DEVICE — SUTURE PERMAHAND SZ 3-0 L18IN NONABSORBABLE BLK L26MM SH C013D